# Patient Record
Sex: MALE | Race: WHITE | NOT HISPANIC OR LATINO | Employment: OTHER | URBAN - METROPOLITAN AREA
[De-identification: names, ages, dates, MRNs, and addresses within clinical notes are randomized per-mention and may not be internally consistent; named-entity substitution may affect disease eponyms.]

---

## 2017-01-06 ENCOUNTER — OFFICE VISIT (OUTPATIENT)
Dept: MEDICAL GROUP | Facility: CLINIC | Age: 52
End: 2017-01-06
Payer: COMMERCIAL

## 2017-01-06 VITALS
RESPIRATION RATE: 18 BRPM | TEMPERATURE: 97.2 F | WEIGHT: 148 LBS | SYSTOLIC BLOOD PRESSURE: 110 MMHG | HEIGHT: 69 IN | OXYGEN SATURATION: 95 % | DIASTOLIC BLOOD PRESSURE: 70 MMHG | HEART RATE: 75 BPM | BODY MASS INDEX: 21.92 KG/M2

## 2017-01-06 DIAGNOSIS — G47.09 OTHER INSOMNIA: ICD-10-CM

## 2017-01-06 DIAGNOSIS — I69.359 HEMIPARESIS AND ALTERATION OF SENSATIONS AS LATE EFFECTS OF CEREBROVASCULAR ACCIDENT (HCC): ICD-10-CM

## 2017-01-06 DIAGNOSIS — I69.398 HEMIPARESIS AND ALTERATION OF SENSATIONS AS LATE EFFECTS OF CEREBROVASCULAR ACCIDENT (HCC): ICD-10-CM

## 2017-01-06 DIAGNOSIS — I10 ESSENTIAL HYPERTENSION: Chronic | ICD-10-CM

## 2017-01-06 PROBLEM — Z85.841: Status: ACTIVE | Noted: 2017-01-06

## 2017-01-06 PROCEDURE — 99214 OFFICE O/P EST MOD 30 MIN: CPT | Performed by: FAMILY MEDICINE

## 2017-01-06 NOTE — PROGRESS NOTES
Chief Complaint   Patient presents with   • Follow-Up     3 month , questions regarding sleeping medication     Multiple medical problems    HISTORY OF PRESENT ILLNESS: Patient is a 51 y.o. male established patient who presents today for the following.      1. Essential hypertension  Patient had added metoprolol to his blood pressure regimen. He found that he did not tolerate the side effects very well and has stopped that medication. He also did not find it to be helpful for his stress..  He has stopped taking that medication. His blood pressure has continued to fall in a good range.    2. Other insomnia  Patient has history of episodic insomnia. He did not want to take a prescription medication. Inquired about over-the-counter medications. We discussed the use of diphenhydramine. Also discussed melatonin.    3. 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 Continues to have right-sided deficits secondary to his stroke. No new strokelike symptoms.      No problem-specific assessment & plan notes found for this encounter.      He  has a past medical history of Hypertension; Snoring; Brain tumor (2011); Other specified disorder of intestines; Barany's nystagmus (11/14/2011); and Vestibular disorder (11/14/2011).    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 60 Cap 0   • baclofen (LIORESAL) 10 MG Tab Take 1 Tab by mouth 3 times a day. 90 Tab 0   • docusate sodium (COLACE) 100 MG CAPS Take 1 Cap by mouth 2 times a day as needed for Constipation. 60 Cap 3     No current facility-administered medications for this visit.       Social History   Substance Use Topics   • Smoking status: Never Smoker    • Smokeless tobacco: Never Used   • Alcohol Use: No      Comment: occ 5-6 drinks per week       Family History   Problem Relation Age of Onset   • Cancer Mother      brain tumor, chronic leukemia   • Hypertension Father    • Hypertension Sister        Health Maintenance:      Review of Systems   No fever, chills, nausea, vomiting, diarrhea, chest pain or shortness of breath.  See HPI    Exam:  Blood pressure 110/70, pulse 75, temperature 36.2 °C (97.2 °F), resp. rate 18, height 1.74 m (5' 8.5\"), weight 67.132 kg (148 lb), SpO2 95 %. Body mass index is 22.17 kg/(m^2).  Constitutional:  NAD, well appearing.  HEENT:   NC/AT, PERRLA, EOMI, OP clear, no lymphadenopathy, no thyromegaly.    Cardiovascular: RRR.   No m/r/g. No carotid bruits.       Lungs:   CTAB, no w/r/r, no respiratory distress.  Abdomen: Soft, NT/ND + BS, no masses, no suprapubic tenderness, no hepatomegaly.  Extremities:  2+ DP and radial pulses bilaterally.  No c/c/e.  Skin:  Warm and dry.    Neurologic: Alert & oriented x 3, right sided facial droop  Psychiatric:  Affect normal, mood normal, judgment normal.    Assessment/Plan:     1. Essential hypertension  Patient has a history of hypertension. Has stopped his beta blocker. Blood pressure is well controlled. Did not help with his anxiety. Labs as indicated  - CBC WITHOUT DIFFERENTIAL; Future  - COMP METABOLIC PANEL; Future  - LIPID PROFILE; Future  - ABO AND RH DETERMINATION    2. " Other insomnia  Discussed the use of diphenhydramine. Patient will try this medication.    3. Hemiparesis and alteration of sensations as late effects of cerebrovascular accident (HCC)  Chronic and ongoing problem for the patient. No new deficits or strokelike symptoms. Continue to monitor. Continue blood pressure management.        Followup: No Follow-up on file.    Please note that this dictation was created using voice recognition software. I have made every reasonable attempt to correct obvious errors, but I expect that there are errors of grammar and possibly content that I did not discover before finalizing the note.

## 2017-01-06 NOTE — MR AVS SNAPSHOT
"        Raul ANTOINNO Hinojosa   2017 9:20 AM   Office Visit   MRN: [id number]    Department:  [facility]   Dept Phone:  [phone]    Description:  Male : 1965   Provider:  Anibal Wilson M.D.           Reason for Visit     Follow-Up 3 month , questions regarding sleeping medication      Allergies as of 2017     Allergen Noted Reactions    Morphine 2011   Vomiting      You were diagnosed with     Essential hypertension   [[id number]]       Hemiparesis and alteration of sensations as late effects of cerebrovascular accident (HCC)   [[id number]]         Vital Signs     Blood Pressure Pulse Temperature Respirations Height Weight    110/70 mmHg 75 36.2 °C (97.2 °F) 18 1.74 m (5' 8.5\") 67.132 kg (148 lb)    Body Mass Index Oxygen Saturation Smoking Status             22.17 kg/m2 95% Never Smoker          Basic Information     Date Of Birth Sex Race Ethnicity Preferred Language    1965 Male White Non- English      Problem List              ICD-10-CM Priority Class Noted - Resolved    Hypertension (Chronic) I10   2011 - Present    Ependymoma of brain (HCC) (Chronic) C71.9   2011 - Present    S/P shoulder surgery right labrum (Chronic) Z98.890   2011 - Present    S/P appendectomy (Chronic) Z90.49   2011 - Present    BPH (benign prostatic hypertrophy) (Chronic) N40.0   2011 - Present    Spondylolisthesis (Chronic) M43.10   2011 - Present    Preventative health care (Chronic) Z00.00   2011 - Present    Martinez's esophagus with esophagitis (Chronic) K22.70, K20.9   2011 - Present    Stroke (HCC) (Chronic) I63.9   3/16/2011 - Present    PFO (patent foramen ovale) (Chronic) Q21.1   2011 - Present    Hearing loss (Chronic)    2011 - Present    Barany's nystagmus H55.09   2011 - Present    Vestibular disorder H81.90   2011 - Present    History of stroke Z86.73   2015 - Present    Hemiparesis and alteration of sensations as late " effects of cerebrovascular accident (HCC) I69.359, I69.398   2/9/2015 - Present      Health Maintenance        Date Due Completion Dates    IMM DTaP/Tdap/Td Vaccine (1 - Tdap) 1/25/1982 ---    [facility]ON CANCER SCREENING ANNUAL FIT 8/15/2017 8/15/2016            Current Immunizations     Influenza Vaccine Quad Inj (Preserved) 11/7/2016 11:50 AM      Below and/or attached are the medications your provider expects you to take. Review all of your home medications and newly ordered medications with your provider and/or pharmacist. Follow medication instructions as directed by your provider and/or pharmacist. Please keep your medication list with you and share with your provider. Update the information when medications are discontinued, doses are changed, or new medications (including over-the-counter products) are added; and carry medication information at all times in the event of emergency situations     Allergies:  MORPHINE - Vomiting               Medications  Valid as of: January 06, 2017 -  9:58 AM    Generic Name Brand Name Tablet Size Instructions for use    Amlodipine Besy-Benazepril HCl (Cap) LOTREL 5-10 MG TAKE 1 CAPSULE BY MOUTH DAILY        Baclofen (Tab) LIORESAL 10 MG Take 1 Tab by mouth 3 times a day.        Docusate Sodium (Cap) COLACE 100 MG Take 1 Cap by mouth 2 times a day as needed for Constipation.        Gabapentin (Cap) NEURONTIN 300 MG Take 1 Cap by mouth 2 Times a Day.        .                 Medicines prescribed today were sent to:     [id number] - [place] - [address]    [address] [facility] [address]    Phone: [phone] Fax: [phone]    Open 24 Hours?: No    [id number] - [place], [place] - [address]    [address] [facility] [place] [id number]    Phone: [phone] Fax: [phone]    Open 24 Hours?: No      Medication refill instructions:       If your prescription bottle indicates you have medication refills left, it is not  necessary to call your provider’s office. Please contact your pharmacy and they will refill your medication.    If your prescription bottle indicates you do not have any refills left, you may request refills at any time through one of the following ways: The online [facility] system (except Urgent Care), by calling your provider’s office, or by asking your pharmacy to contact your provider’s office with a refill request. Medication refills are processed only during regular business hours and may not be available until the next business day. Your provider may request additional information or to have a follow-up visit with you prior to refilling your medication.   *Please Note: Medication refills are assigned a new Rx number when refilled electronically. Your pharmacy may indicate that no refills were authorized even though a new prescription for the same medication is available at the pharmacy. Please request the medicine by name with the pharmacy before contacting your provider for a refill.        Your To Do List     Future Labs/Procedures Complete By Expires    CBC WITHOUT DIFFERENTIAL  As directed 7/9/2017    COMP METABOLIC PANEL  As directed 1/7/2018    LIPID PROFILE  As directed 1/7/2018      Other Notes About Your Plan     3/11 consider flomax trial next visit  8/2/11 Dr. Al neurosurgery note recommend repeat MRI with and without contrast 4 months, if no change then q.6 months           MyChart Access Code: Activation code not generated  Current [facility] Status: Active

## 2017-01-19 ENCOUNTER — HOSPITAL ENCOUNTER (OUTPATIENT)
Dept: LAB | Facility: MEDICAL CENTER | Age: 52
End: 2017-01-19
Attending: FAMILY MEDICINE
Payer: COMMERCIAL

## 2017-01-19 DIAGNOSIS — I10 ESSENTIAL HYPERTENSION: Chronic | ICD-10-CM

## 2017-01-19 LAB
ABO GROUP BLD: NORMAL
ALBUMIN SERPL BCP-MCNC: 4.6 G/DL (ref 3.2–4.9)
ALBUMIN/GLOB SERPL: 1.5 G/DL
ALP SERPL-CCNC: 82 U/L (ref 30–99)
ALT SERPL-CCNC: 19 U/L (ref 2–50)
ANION GAP SERPL CALC-SCNC: 6 MMOL/L (ref 0–11.9)
AST SERPL-CCNC: 21 U/L (ref 12–45)
BILIRUB SERPL-MCNC: 0.8 MG/DL (ref 0.1–1.5)
BUN SERPL-MCNC: 16 MG/DL (ref 8–22)
CALCIUM SERPL-MCNC: 9.8 MG/DL (ref 8.5–10.5)
CHLORIDE SERPL-SCNC: 105 MMOL/L (ref 96–112)
CHOLEST SERPL-MCNC: 170 MG/DL (ref 100–199)
CO2 SERPL-SCNC: 31 MMOL/L (ref 20–33)
CREAT SERPL-MCNC: 0.92 MG/DL (ref 0.5–1.4)
ERYTHROCYTE [DISTWIDTH] IN BLOOD BY AUTOMATED COUNT: 38.7 FL (ref 35.9–50)
GLOBULIN SER CALC-MCNC: 3.1 G/DL (ref 1.9–3.5)
GLUCOSE SERPL-MCNC: 95 MG/DL (ref 65–99)
HCT VFR BLD AUTO: 47.3 % (ref 42–52)
HDLC SERPL-MCNC: 57 MG/DL
HGB BLD-MCNC: 15.1 G/DL (ref 14–18)
LDLC SERPL CALC-MCNC: 101 MG/DL
MCH RBC QN AUTO: 28.5 PG (ref 27–33)
MCHC RBC AUTO-ENTMCNC: 31.9 G/DL (ref 33.7–35.3)
MCV RBC AUTO: 89.4 FL (ref 81.4–97.8)
PLATELET # BLD AUTO: 211 K/UL (ref 164–446)
PMV BLD AUTO: 10.2 FL (ref 9–12.9)
POTASSIUM SERPL-SCNC: 4.4 MMOL/L (ref 3.6–5.5)
PROT SERPL-MCNC: 7.7 G/DL (ref 6–8.2)
RBC # BLD AUTO: 5.29 M/UL (ref 4.7–6.1)
RH BLD: NORMAL
SODIUM SERPL-SCNC: 142 MMOL/L (ref 135–145)
TRIGL SERPL-MCNC: 60 MG/DL (ref 0–149)
WBC # BLD AUTO: 3.9 K/UL (ref 4.8–10.8)

## 2017-01-19 PROCEDURE — 80053 COMPREHEN METABOLIC PANEL: CPT

## 2017-01-19 PROCEDURE — 86901 BLOOD TYPING SEROLOGIC RH(D): CPT

## 2017-01-19 PROCEDURE — 36415 COLL VENOUS BLD VENIPUNCTURE: CPT

## 2017-01-19 PROCEDURE — 80061 LIPID PANEL: CPT

## 2017-01-19 PROCEDURE — 85027 COMPLETE CBC AUTOMATED: CPT

## 2017-01-19 PROCEDURE — 86900 BLOOD TYPING SEROLOGIC ABO: CPT

## 2017-03-31 ENCOUNTER — TELEPHONE (OUTPATIENT)
Dept: MEDICAL GROUP | Facility: MEDICAL CENTER | Age: 52
End: 2017-03-31

## 2017-03-31 DIAGNOSIS — H11.151 PINGUECULA OF RIGHT EYE: ICD-10-CM

## 2017-03-31 DIAGNOSIS — H25.093: ICD-10-CM

## 2017-03-31 DIAGNOSIS — H04.123 BILATERAL DRY EYES: ICD-10-CM

## 2017-03-31 DIAGNOSIS — H40.023 OPEN ANGLE WITH BORDERLINE FINDINGS AND HIGH GLAUCOMA RISK IN BOTH EYES: ICD-10-CM

## 2017-03-31 NOTE — TELEPHONE ENCOUNTER
Pt.'s prev PCP is Dr. Wilson and needs a referral to an eye MD. I pended the referral.  Thank you,

## 2017-04-10 DIAGNOSIS — H04.123 DRY EYES, BILATERAL: ICD-10-CM

## 2017-04-10 DIAGNOSIS — H40.023 OPEN ANGLE WITH BORDERLINE FINDINGS AND HIGH GLAUCOMA RISK IN BOTH EYES: ICD-10-CM

## 2017-04-10 DIAGNOSIS — H11.151 PINGUECULA OF RIGHT EYE: ICD-10-CM

## 2017-04-10 DIAGNOSIS — H25.093: ICD-10-CM

## 2017-04-11 ENCOUNTER — OFFICE VISIT (OUTPATIENT)
Dept: MEDICAL GROUP | Facility: PHYSICIAN GROUP | Age: 52
End: 2017-04-11
Payer: COMMERCIAL

## 2017-04-11 VITALS
OXYGEN SATURATION: 91 % | HEIGHT: 69 IN | RESPIRATION RATE: 16 BRPM | SYSTOLIC BLOOD PRESSURE: 100 MMHG | WEIGHT: 147.71 LBS | BODY MASS INDEX: 21.88 KG/M2 | TEMPERATURE: 97.7 F | DIASTOLIC BLOOD PRESSURE: 70 MMHG | HEART RATE: 90 BPM

## 2017-04-11 DIAGNOSIS — I10 ESSENTIAL HYPERTENSION: ICD-10-CM

## 2017-04-11 DIAGNOSIS — Z23 NEED FOR VACCINATION: ICD-10-CM

## 2017-04-11 DIAGNOSIS — Z85.841 HISTORY OF EPENDYMOMA OF BRAIN: ICD-10-CM

## 2017-04-11 DIAGNOSIS — Z86.73 HISTORY OF STROKE: ICD-10-CM

## 2017-04-11 DIAGNOSIS — I69.359 HEMIPARESIS AND ALTERATION OF SENSATIONS AS LATE EFFECTS OF CEREBROVASCULAR ACCIDENT (HCC): ICD-10-CM

## 2017-04-11 DIAGNOSIS — I69.398 HEMIPARESIS AND ALTERATION OF SENSATIONS AS LATE EFFECTS OF CEREBROVASCULAR ACCIDENT (HCC): ICD-10-CM

## 2017-04-11 PROCEDURE — 90471 IMMUNIZATION ADMIN: CPT | Performed by: FAMILY MEDICINE

## 2017-04-11 PROCEDURE — 90715 TDAP VACCINE 7 YRS/> IM: CPT | Performed by: FAMILY MEDICINE

## 2017-04-11 PROCEDURE — 99214 OFFICE O/P EST MOD 30 MIN: CPT | Mod: 25 | Performed by: FAMILY MEDICINE

## 2017-04-11 RX ORDER — AMLODIPINE BESYLATE AND BENAZEPRIL HYDROCHLORIDE 2.5; 1 MG/1; MG/1
1 CAPSULE ORAL DAILY
Qty: 30 CAP | Refills: 2 | Status: SHIPPED | OUTPATIENT
Start: 2017-04-11 | End: 2017-06-12

## 2017-04-11 NOTE — PATIENT INSTRUCTIONS

## 2017-04-11 NOTE — PROGRESS NOTES
Raul Hinojosa is a 52 y.o. male here to establish care and discuss blood pressure.    HPI:  Raul is a 52-year-old pleasant male here to establish care. His previous PCP was Dr. Wilson.    Essential hypertension  BP slightly low today. Patient does monitor his blood pressure at home and it has been in the low 100s to 110s over 60s to 70s. He is currently taking Lotrel 5-10 milligrams per day. Denies any side effects. Denies vision changes, headaches, shortness of breath or chest pain. He tells me that his blood pressure problems were diagnosed approximately 6 years ago.    History of ependymoma of brain  Per patient history. Diagnosed ~2011 when he went to the hospital for high blood pressure. He did have surgery by Dr. Beckford.    History of stroke  Patient has a history of stroke secondary to brain surgery. Has some residual vision, sensation and strength issues. He is on disability. He takes his medications as prescribed.      Current medicines (including changes today)  Current Outpatient Prescriptions   Medication Sig Dispense Refill   • amlodipine-benazepril (LOTREL) 2.5-10 MG oral capsule Take 1 Cap by mouth every day. 30 Cap 2   • gabapentin (NEURONTIN) 300 MG Cap Take 1 Cap by mouth 2 Times a Day. 60 Cap 0   • baclofen (LIORESAL) 10 MG Tab Take 1 Tab by mouth 3 times a day. 90 Tab 0   • docusate sodium (COLACE) 100 MG CAPS Take 1 Cap by mouth 2 times a day as needed for Constipation. 60 Cap 3     No current facility-administered medications for this visit.     He  has a past medical history of Hypertension; Snoring; Brain tumor (CMS-HCC) (2011); Other specified disorder of intestines; Barany's nystagmus (11/14/2011); and Vestibular disorder (11/14/2011).  He  has past surgical history that includes appendectomy (1983); other orthopedic surgery (1996); craniotomy (2/7/2011); cervical laminectomy posterior (2/7/2011); and gastroscopy with biopsy (2/22/2011).  Social History   Substance Use Topics   •  "Smoking status: Never Smoker    • Smokeless tobacco: Never Used   • Alcohol Use: No      Comment: occ 5-6 drinks per week     Social History     Social History Narrative     Family History   Problem Relation Age of Onset   • Cancer Mother      brain tumor, chronic leukemia   • Hypertension Father    • Hypertension Sister      Family Status   Relation Status Death Age   • Mother Alive    • Father Alive    • Sister Alive    • Sister Alive      ROS  Constitutional: Negative for fever, chills and malaise/fatigue.   HENT: Negative for congestion.    Eyes: Negative for pain.   Respiratory: Negative for cough and shortness of breath.    Cardiovascular: Negative for leg swelling.   Gastrointestinal: Negative for nausea, vomiting, abdominal pain and diarrhea.   Genitourinary: Negative for dysuria and hematuria.   Skin: Negative for rash.   Neurological: Negative for dizziness, focal weakness and headaches.   Endo/Heme/Allergies: Does not bruise/bleed easily.   Psychiatric/Behavioral: Negative for depression.  The patient is not nervous/anxious.       Objective:     Physical Exam:  Blood pressure 100/70, pulse 90, temperature 36.5 °C (97.7 °F), resp. rate 16, height 1.753 m (5' 9.02\"), weight 67 kg (147 lb 11.3 oz), SpO2 91 %. Body mass index is 21.8 kg/(m^2).  Constitutional: Alert, no distress.  Skin: Warm, dry, good turgor, no rashes in visible areas.  Eye: Equal, round and reactive, conjunctiva clear, lids normal.  ENMT: +Right hearing aide, lips without lesions, good dentition, oropharynx clear.  Neck: Trachea midline, no masses, no thyromegaly. No cervical or supraclavicular lymphadenopathy.  Respiratory: Unlabored respiratory effort, lungs clear to auscultation, no wheezes, no ronchi.  Cardiovascular: Normal S1, S2, no murmur, no edema.  Abdomen: Soft, non-tender, no masses, no hepatosplenomegaly.  Psych: Alert and oriented x3, normal affect and mood.    Assessment and Plan:     1. Essential hypertension  Slightly " hypotensive today. Query if he needs two blood pressure medications. I will decrease the amlodipine to 2.5mg with goal to discontinue it and continue benazepril. He will continue to monitor at home.  - amlodipine-benazepril (LOTREL) 2.5-10 MG oral capsule; Take 1 Cap by mouth every day.  Dispense: 30 Cap; Refill: 2    2. History of ependymoma of brain  3. History of stroke  4. Hemiparesis and alteration of sensations as late effects of cerebrovascular accident (CMS-HCC)  Per patient history. No indication for referral to neurology or PT at this time. Continue current medications.    5. Need for vaccination  Tdap discussed and administered in office today.  - Tdap =>8yo IM    Records reviewed in Epic.  Followup: Return in about 2 months (around 6/11/2017) for f/u HTN and BP med change, short.         PLEASE NOTE: This dictation was created using voice recognition software. I have made every reasonable attempt to correct obvious errors, but I expect that there are errors of grammar and possibly content that I did not discover before finalizing the note.

## 2017-04-11 NOTE — MR AVS SNAPSHOT
"        Raul Hinojosa   2017 9:20 AM   Office Visit   MRN: [id number]    Department:  [facility]   Dept Phone:  [phone]    Description:  Male : 1965   Provider:  Mildred Vaz M.D.           Reason for Visit     Medication Refill Lotrel    Immunizations TDAP      Allergies as of 2017     Allergen Noted Reactions    Morphine 2011   Vomiting      You were diagnosed with     Essential hypertension   [9978027]       History of ependymoma of brain   [8466517]       History of stroke   [069732]       Hemiparesis and alteration of sensations as late effects of cerebrovascular accident (CMS-HCC)   [054508]       Need for vaccination   [591369]         Vital Signs     Blood Pressure Pulse Temperature Respirations Height Weight    100/70 mmHg 90 36.5 °C (97.7 °F) 16 1.753 m (5' 9.02\") 67 kg (147 lb 11.3 oz)    Body Mass Index Oxygen Saturation Smoking Status             21.80 kg/m2 91% Never Smoker          Basic Information     Date Of Birth Sex Race Ethnicity Preferred Language    1965 Male White Non- English      Your appointments     2017  8:40 AM   Established Patient with Mildred Vaz M.D.   [facility] (--)    [address]  [address]  [facility] [address] [phone]           You will be receiving a confirmation call a few days before your appointment from our automated call confirmation system.              Problem List              ICD-10-CM Priority Class Noted - Resolved    Essential hypertension I10   2011 - Present    BPH (benign prostatic hypertrophy) (Chronic) N40.0   2011 - Present    Spondylolisthesis (Chronic) M43.10   2011 - Present    Martinez's esophagus with esophagitis (Chronic) K22.70, K20.9   2011 - Present    PFO (patent foramen ovale) (Chronic) Q21.1   2011 - Present    Hearing loss (Chronic)    2011 - Present    Barany's nystagmus H55.09   2011 - Present    Vestibular disorder H81.90   " 11/14/2011 - Present    History of stroke Z86.73   2/9/2015 - Present    Hemiparesis and alteration of sensations as late effects of cerebrovascular accident (CMS-HCC) I69.359, I69.398   2/9/2015 - Present    History of ependymoma of brain Z85.841   1/6/2017 - Present      Health Maintenance        Date Due Completion Dates    IMM DTaP/Tdap/Td Vaccine (1 - Tdap) 1/25/1982 ---    COLON CANCER SCREENING ANNUAL FIT 8/15/2017 8/15/2016            Current Immunizations     Influenza Vaccine Quad Inj (Preserved) 11/7/2016 11:50 AM    Tdap Vaccine 4/11/2017 10:08 AM      Below and/or attached are the medications your provider expects you to take. Review all of your home medications and newly ordered medications with your provider and/or pharmacist. Follow medication instructions as directed by your provider and/or pharmacist. Please keep your medication list with you and share with your provider. Update the information when medications are discontinued, doses are changed, or new medications (including over-the-counter products) are added; and carry medication information at all times in the event of emergency situations     Allergies:  MORPHINE - Vomiting               Medications  Valid as of: April 11, 2017 - 10:23 AM    Generic Name Brand Name Tablet Size Instructions for use    Amlodipine Besy-Benazepril HCl (Cap) LOTREL 2.5-10 MG Take 1 Cap by mouth every day.        Baclofen (Tab) LIORESAL 10 MG Take 1 Tab by mouth 3 times a day.        Docusate Sodium (Cap) COLACE 100 MG Take 1 Cap by mouth 2 times a day as needed for Constipation.        Gabapentin (Cap) NEURONTIN 300 MG Take 1 Cap by mouth 2 Times a Day.        .                 Medicines prescribed today were sent to:     [id number] - [place] - [address]    [address] [place] [address]    Phone: [phone] Fax: [phone]    Open 24 Hours?: No    [facility] - [place], [place] - [address]    [address] [facility]  Pharmacy (not Specialty) [place] [id number]    Phone: [phone] Fax: [phone]    Open 24 Hours?: No      Medication refill instructions:       If your prescription bottle indicates you have medication refills left, it is not necessary to call your provider’s office. Please contact your pharmacy and they will refill your medication.    If your prescription bottle indicates you do not have any refills left, you may request refills at any time through one of the following ways: The online [employer] system (except Urgent Care), by calling your provider’s office, or by asking your pharmacy to contact your provider’s office with a refill request. Medication refills are processed only during regular business hours and may not be available until the next business day. Your provider may request additional information or to have a follow-up visit with you prior to refilling your medication.   *Please Note: Medication refills are assigned a new Rx number when refilled electronically. Your pharmacy may indicate that no refills were authorized even though a new prescription for the same medication is available at the pharmacy. Please request the medicine by name with the pharmacy before contacting your provider for a refill.        Instructions    Hypertension  Hypertension, commonly called high blood pressure, is when the force of blood pumping through your arteries is too strong. Your arteries are the blood vessels that carry blood from your heart throughout your body. A blood pressure reading consists of a higher number over a lower number, such as 110/72. The higher number (systolic) is the pressure inside your arteries when your heart pumps. The lower number (diastolic) is the pressure inside your arteries when your heart relaxes. Ideally you want your blood pressure below 120/80.  Hypertension forces your heart to work harder to pump blood. Your arteries may become narrow or stiff. Having untreated or uncontrolled  hypertension can cause heart attack, stroke, kidney disease, and other problems.  RISK FACTORS  Some risk factors for high blood pressure are controllable. Others are not.   Risk factors you cannot control include:   · Race. You may be at higher risk if you are .  · Age. Risk increases with age.  · Gender. Men are at higher risk than women before age 45 years. After age 65, women are at higher risk than men.  Risk factors you can control include:  · Not getting enough exercise or physical activity.  · Being overweight.  · Getting too much fat, sugar, calories, or salt in your diet.  · Drinking too much alcohol.  SIGNS AND SYMPTOMS  Hypertension does not usually cause signs or symptoms. Extremely high blood pressure (hypertensive crisis) may cause headache, anxiety, shortness of breath, and nosebleed.  DIAGNOSIS  To check if you have hypertension, your health care provider will measure your blood pressure while you are seated, with your arm held at the level of your heart. It should be measured at least twice using the same arm. Certain conditions can cause a difference in blood pressure between your right and left arms. A blood pressure reading that is higher than normal on one occasion does not mean that you need treatment. If it is not clear whether you have high blood pressure, you may be asked to return on a different day to have your blood pressure checked again. Or, you may be asked to monitor your blood pressure at home for 1 or more weeks.  TREATMENT  Treating high blood pressure includes making lifestyle changes and possibly taking medicine. Living a healthy lifestyle can help lower high blood pressure. You may need to change some of your habits.  Lifestyle changes may include:  · Following the DASH diet. This diet is high in fruits, vegetables, and whole grains. It is low in salt, red meat, and added sugars.  · Keep your sodium intake below 2,300 mg per day.  · Getting at least 30-45  minutes of aerobic exercise at least 4 times per week.  · Losing weight if necessary.  · Not smoking.  · Limiting alcoholic beverages.  · Learning ways to reduce stress.  Your health care provider may prescribe medicine if lifestyle changes are not enough to get your blood pressure under control, and if one of the following is true:  · You are 18-59 years of age and your systolic blood pressure is above 140.  · You are 60 years of age or older, and your systolic blood pressure is above 150.  · Your diastolic blood pressure is above 90.  · You have diabetes, and your systolic blood pressure is over 140 or your diastolic blood pressure is over 90.  · You have kidney disease and your blood pressure is above 140/90.  · You have heart disease and your blood pressure is above 140/90.  Your personal target blood pressure may vary depending on your medical conditions, your age, and other factors.  HOME CARE INSTRUCTIONS  · Have your blood pressure rechecked as directed by your health care provider.    · Take medicines only as directed by your health care provider. Follow the directions carefully. Blood pressure medicines must be taken as prescribed. The medicine does not work as well when you skip doses. Skipping doses also puts you at risk for problems.  · Do not smoke.    · Monitor your blood pressure at home as directed by your health care provider.   SEEK MEDICAL CARE IF:   · You think you are having a reaction to medicines taken.  · You have recurrent headaches or feel dizzy.  · You have swelling in your ankles.  · You have trouble with your vision.  SEEK IMMEDIATE MEDICAL CARE IF:  · You develop a severe headache or confusion.  · You have unusual weakness, numbness, or feel faint.  · You have severe chest or abdominal pain.  · You vomit repeatedly.  · You have trouble breathing.  MAKE SURE YOU:   · Understand these instructions.  · Will watch your condition.  · Will get help right away if you are not doing well or get  worse.     This information is not intended to replace advice given to you by your health care provider. Make sure you discuss any questions you have with your health care provider.     Document Released: 12/18/2006 Document Revised: 05/03/2016 Document Reviewed: 10/10/2014  [employer] Interactive Patient Education ©2016 [employer]vier Inc.            [employer] Access Code: Activation code not generated  Current [employer] Status: Active

## 2017-04-11 NOTE — ASSESSMENT & PLAN NOTE
Per patient history. Diagnosed ~2011 when he went to the hospital for high blood pressure. He did have surgery by Dr. Beckford.

## 2017-04-11 NOTE — ASSESSMENT & PLAN NOTE
BP slightly low today. Patient does monitor his blood pressure at home and it has been in the low 100s to 110s over 60s to 70s. He is currently taking Lotrel 5-10 milligrams per day. Denies any side effects. Denies vision changes, headaches, shortness of breath or chest pain. He tells me that his blood pressure problems were diagnosed approximately 6 years ago.

## 2017-04-11 NOTE — ASSESSMENT & PLAN NOTE
Patient has a history of stroke secondary to brain surgery. Has some residual vision, sensation and strength issues. He is on disability. He takes his medications as prescribed.

## 2017-06-12 ENCOUNTER — OFFICE VISIT (OUTPATIENT)
Dept: MEDICAL GROUP | Facility: PHYSICIAN GROUP | Age: 52
End: 2017-06-12
Payer: COMMERCIAL

## 2017-06-12 VITALS
HEIGHT: 69 IN | BODY MASS INDEX: 22.31 KG/M2 | SYSTOLIC BLOOD PRESSURE: 112 MMHG | HEART RATE: 86 BPM | OXYGEN SATURATION: 96 % | DIASTOLIC BLOOD PRESSURE: 72 MMHG | WEIGHT: 150.6 LBS | RESPIRATION RATE: 16 BRPM | TEMPERATURE: 97.9 F

## 2017-06-12 DIAGNOSIS — Z86.73 HISTORY OF STROKE: ICD-10-CM

## 2017-06-12 DIAGNOSIS — I10 ESSENTIAL HYPERTENSION: ICD-10-CM

## 2017-06-12 PROCEDURE — 99214 OFFICE O/P EST MOD 30 MIN: CPT | Performed by: FAMILY MEDICINE

## 2017-06-12 RX ORDER — BENAZEPRIL HYDROCHLORIDE 10 MG/1
10 TABLET ORAL DAILY
Qty: 30 TAB | Refills: 2 | Status: SHIPPED | OUTPATIENT
Start: 2017-06-12 | End: 2017-09-08 | Stop reason: SDUPTHER

## 2017-06-12 NOTE — PROGRESS NOTES
"Subjective:   Raul Hinojosa is a 52 y.o. male here today for follow-up hypertension.    Essential hypertension  Stable. Monitoring BP at home. Currently taking amlodipine-benazepril 2.5-10mg daily as directed. The amlodipine dose was decreased at his last appointment. Also taking baby aspirin. Denies lightheadedness, vision changes, headache, palpitations or leg swelling. He does have a history of stroke and ependymoma resection.     Current medicines (including changes today)  Current Outpatient Prescriptions   Medication Sig Dispense Refill   • benazepril (LOTENSIN) 10 MG Tab Take 1 Tab by mouth every day. 30 Tab 2   • gabapentin (NEURONTIN) 300 MG Cap Take 1 Cap by mouth 2 Times a Day. 60 Cap 0   • baclofen (LIORESAL) 10 MG Tab Take 1 Tab by mouth 3 times a day. 90 Tab 0     No current facility-administered medications for this visit.     He  has a past medical history of Hypertension; Snoring; Brain tumor (CMS-HCC) (2011); Other specified disorder of intestines; Barany's nystagmus (11/14/2011); and Vestibular disorder (11/14/2011).    ROS   See HPI. No chest pain, no shortness of breath, no abdominal pain.     Objective:     Physical Exam:  Blood pressure 112/72, pulse 86, temperature 36.6 °C (97.9 °F), resp. rate 16, height 1.753 m (5' 9.02\"), SpO2 96 %. There is no weight on file to calculate BMI.   Constitutional: Alert, no distress.  Skin: Warm, dry, good turgor, no rashes in visible areas.  Eye: Conjunctiva clear, lids normal.  ENMT: Lips without lesions, good dentition, oropharynx clear.  Neck: Trachea midline, no masses, no thyromegaly.  Respiratory: Unlabored respiratory effort, lungs clear to auscultation, no wheezes, no ronchi.  Cardiovascular: Normal S1, S2, no murmur, no edema.  Abdomen: Soft, non-tender, no masses, no hepatosplenomegaly.  Psych: Alert and oriented x3, normal affect and mood.    Assessment and Plan:     1. Essential hypertension  2. History of stroke  Little change in blood pressure " with decrease in amlodipine. Will discontinue amlodipine component. Prescription sent for benazepril 10mg to pharmacy. Continue to monitor at home. Patient instructed to notify us if he has any issues with the medication change.  - benazepril (LOTENSIN) 10 MG Tab; Take 1 Tab by mouth every day.  Dispense: 30 Tab; Refill: 2    Followup: Return in about 3 months (around 9/12/2017) for HTN, Short.         PLEASE NOTE: This dictation was created using voice recognition software. I have made every reasonable attempt to correct obvious errors, but I expect that there are errors of grammar and possibly content that I did not discover before finalizing the note.

## 2017-06-12 NOTE — ASSESSMENT & PLAN NOTE
Stable. Monitoring BP at home. Currently taking amlodipine-benazepril 2.5-10mg daily as directed. The amlodipine dose was decreased at his last appointment. Also taking baby aspirin. Denies lightheadedness, vision changes, headache, palpitations or leg swelling. He does have a history of stroke and ependymoma resection.    He mentions that his [place]

## 2017-06-12 NOTE — MR AVS SNAPSHOT
"        Raul Hinojosa   2017 8:40 AM   Office Visit   MRN: [id number]    Department:  [facility]   Dept Phone:  [phone]    Description:  Male : 1965   Provider:  Mildred Vaz M.D.           Reason for Visit     Hypertension           Allergies as of 2017     Allergen Noted Reactions    Morphine 2011   Vomiting      You were diagnosed with     Essential hypertension   [0420370]       History of stroke   [048214]         Vital Signs     Blood Pressure Pulse Temperature Respirations Height Weight    112/72 mmHg 86 36.6 °C (97.9 °F) 16 1.753 m (5' 9.02\") 68.312 kg (150 lb 9.6 oz)    Body Mass Index Oxygen Saturation Smoking Status             22.23 kg/m2 96% Never Smoker          Basic Information     Date Of Birth Sex Race Ethnicity Preferred Language    1965 Male White Non- English      Your appointments     Sep 15, 2017  8:00 AM   Established Patient with Mildred Vaz M.D.   [facility] (--)    [address]  [address]  [facility] [address] [phone]           You will be receiving a confirmation call a few days before your appointment from our automated call confirmation system.              Problem List              ICD-10-CM Priority Class Noted - Resolved    Essential hypertension I10   2011 - Present    BPH (benign prostatic hypertrophy) (Chronic) N40.0   2011 - Present    Spondylolisthesis (Chronic) M43.10   2011 - Present    Martinez's esophagus with esophagitis (Chronic) K22.70, K20.9   2011 - Present    PFO (patent foramen ovale) (Chronic) Q21.1   2011 - Present    Hearing loss (Chronic)    2011 - Present    Barany's nystagmus H55.09   2011 - Present    Vestibular disorder H81.90   2011 - Present    History of stroke Z86.73   2015 - Present    Hemiparesis and alteration of sensations as late effects of cerebrovascular accident (CMS-[facility]) I69.359, I69.398   2015 - Present    History of ependymoma " of brain Z85.841   1/6/2017 - Present      Health Maintenance        Date Due Completion Dates    COLON CANCER SCREENING ANNUAL FIT 8/15/2017 8/15/2016    IMM DTaP/Tdap/Td Vaccine (2 - Td) 4/11/2027 4/11/2017            Current Immunizations     Influenza Vaccine Quad Inj (Preserved) 11/7/2016 11:50 AM    Tdap Vaccine 4/11/2017 10:08 AM      Below and/or attached are the medications your provider expects you to take. Review all of your home medications and newly ordered medications with your provider and/or pharmacist. Follow medication instructions as directed by your provider and/or pharmacist. Please keep your medication list with you and share with your provider. Update the information when medications are discontinued, doses are changed, or new medications (including over-the-counter products) are added; and carry medication information at all times in the event of emergency situations     Allergies:  MORPHINE - Vomiting               Medications  Valid as of: June 12, 2017 -  8:53 AM    Generic Name Brand Name Tablet Size Instructions for use    Baclofen (Tab) LIORESAL 10 MG Take 1 Tab by mouth 3 times a day.        Benazepril HCl (Tab) LOTENSIN 10 MG Take 1 Tab by mouth every day.        Gabapentin (Cap) NEURONTIN 300 MG Take 1 Cap by mouth 2 Times a Day.        .                 Medicines prescribed today were sent to:     [facility] - [place] - [address]    [address] [facility] [address]    Phone: [phone] Fax: [phone]    Open 24 Hours?: No    [facility] [place] [facility] - [place], [place] - [address]    [address] [facility] Order Pharmacy (not Specialty) [place] [address]    Phone: [phone] Fax: [phone]    Open 24 Hours?: No      Medication refill instructions:       If your prescription bottle indicates you have medication refills left, it is not necessary to call your provider’s office. Please contact your pharmacy and they will refill your medication.    If your  prescription bottle indicates you do not have any refills left, you may request refills at any time through one of the following ways: The online [facility] system (except Urgent Care), by calling your provider’s office, or by asking your pharmacy to contact your provider’s office with a refill request. Medication refills are processed only during regular business hours and may not be available until the next business day. Your provider may request additional information or to have a follow-up visit with you prior to refilling your medication.   *Please Note: Medication refills are assigned a new Rx number when refilled electronically. Your pharmacy may indicate that no refills were authorized even though a new prescription for the same medication is available at the pharmacy. Please request the medicine by name with the pharmacy before contacting your provider for a refill.           [facility] Access Code: Activation code not generated  Current [facility] Status: Active

## 2017-06-14 ENCOUNTER — TELEPHONE (OUTPATIENT)
Dept: MEDICAL GROUP | Facility: PHYSICIAN GROUP | Age: 52
End: 2017-06-14

## 2017-06-14 NOTE — TELEPHONE ENCOUNTER
Patient called and LVM stating he is going to [place] and is requesting a handicap letter from you to excuse him from standing in line for the rides there.  To  to advise.

## 2017-06-14 NOTE — Clinical Note
2017      To Whom It May Concern:    Re: Raul Hinojosa; : 1965    Mr. Hinojosa is a patient of mine and his most recent appointment was 2017. He will be traveling to [facility] this summer. His medical history is significant for a brain tumor and stroke. Due to this, he has residual disabilities including muscle weakness and vision issues that limit his physical activity.    It would be greatly appreciated it if he is excused from waiting in lines. He may participate in any rides or exhibits at [facility].    Thank you for helping my patient. Please feel free to call our office if you have any questions.      Sincerely,        Mildred Vaz M.D.

## 2017-09-08 DIAGNOSIS — I10 ESSENTIAL HYPERTENSION: ICD-10-CM

## 2017-09-08 RX ORDER — BENAZEPRIL HYDROCHLORIDE 10 MG/1
10 TABLET ORAL DAILY
Qty: 30 TAB | Refills: 11 | Status: SHIPPED | OUTPATIENT
Start: 2017-09-08 | End: 2017-09-15 | Stop reason: SDUPTHER

## 2017-09-12 ENCOUNTER — HOSPITAL ENCOUNTER (OUTPATIENT)
Dept: LAB | Facility: MEDICAL CENTER | Age: 52
End: 2017-09-12
Attending: PSYCHIATRY & NEUROLOGY
Payer: COMMERCIAL

## 2017-09-12 ENCOUNTER — HOSPITAL ENCOUNTER (OUTPATIENT)
Dept: LAB | Facility: MEDICAL CENTER | Age: 52
End: 2017-09-12
Payer: COMMERCIAL

## 2017-09-12 LAB
ALBUMIN SERPL BCP-MCNC: 4.5 G/DL (ref 3.2–4.9)
ALBUMIN/GLOB SERPL: 1.5 G/DL
ALP SERPL-CCNC: 70 U/L (ref 30–99)
ALT SERPL-CCNC: 15 U/L (ref 2–50)
ANION GAP SERPL CALC-SCNC: 9 MMOL/L (ref 0–11.9)
AST SERPL-CCNC: 20 U/L (ref 12–45)
BASOPHILS # BLD AUTO: 1 % (ref 0–1.8)
BASOPHILS # BLD: 0.04 K/UL (ref 0–0.12)
BDY FAT % MEASURED: 19.6 %
BILIRUB SERPL-MCNC: 0.8 MG/DL (ref 0.1–1.5)
BP DIAS: 78 MMHG
BP SYS: 107 MMHG
BUN SERPL-MCNC: 12 MG/DL (ref 8–22)
CALCIUM SERPL-MCNC: 9.8 MG/DL (ref 8.5–10.5)
CHLORIDE SERPL-SCNC: 104 MMOL/L (ref 96–112)
CHOLEST SERPL-MCNC: 179 MG/DL (ref 100–199)
CO2 SERPL-SCNC: 27 MMOL/L (ref 20–33)
CREAT SERPL-MCNC: 0.77 MG/DL (ref 0.5–1.4)
DIABETES HTDIA: NO
EOSINOPHIL # BLD AUTO: 0.09 K/UL (ref 0–0.51)
EOSINOPHIL NFR BLD: 2.3 % (ref 0–6.9)
ERYTHROCYTE [DISTWIDTH] IN BLOOD BY AUTOMATED COUNT: 38.8 FL (ref 35.9–50)
EVENT NAME HTEVT: NORMAL
FASTING HTFAS: YES
GFR SERPL CREATININE-BSD FRML MDRD: >60 ML/MIN/1.73 M 2
GLOBULIN SER CALC-MCNC: 3 G/DL (ref 1.9–3.5)
GLUCOSE SERPL-MCNC: 92 MG/DL (ref 65–99)
GLUCOSE SERPL-MCNC: 99 MG/DL (ref 65–99)
HCT VFR BLD AUTO: 46.6 % (ref 42–52)
HDLC SERPL-MCNC: 55 MG/DL
HGB BLD-MCNC: 15.4 G/DL (ref 14–18)
HYPERTENSION HTHYP: YES
IMM GRANULOCYTES # BLD AUTO: 0 K/UL (ref 0–0.11)
IMM GRANULOCYTES NFR BLD AUTO: 0 % (ref 0–0.9)
LDLC SERPL CALC-MCNC: 111 MG/DL
LYMPHOCYTES # BLD AUTO: 1.55 K/UL (ref 1–4.8)
LYMPHOCYTES NFR BLD: 39.5 % (ref 22–41)
MCH RBC QN AUTO: 29.2 PG (ref 27–33)
MCHC RBC AUTO-ENTMCNC: 33 G/DL (ref 33.7–35.3)
MCV RBC AUTO: 88.4 FL (ref 81.4–97.8)
MONOCYTES # BLD AUTO: 0.4 K/UL (ref 0–0.85)
MONOCYTES NFR BLD AUTO: 10.2 % (ref 0–13.4)
NEUTROPHILS # BLD AUTO: 1.84 K/UL (ref 1.82–7.42)
NEUTROPHILS NFR BLD: 47 % (ref 44–72)
NRBC # BLD AUTO: 0 K/UL
NRBC BLD AUTO-RTO: 0 /100 WBC
PLATELET # BLD AUTO: 220 K/UL (ref 164–446)
PMV BLD AUTO: 9.8 FL (ref 9–12.9)
POTASSIUM SERPL-SCNC: 4.5 MMOL/L (ref 3.6–5.5)
PROT SERPL-MCNC: 7.5 G/DL (ref 6–8.2)
RBC # BLD AUTO: 5.27 M/UL (ref 4.7–6.1)
SCREENING LOC CITY HTCIT: NORMAL
SCREENING LOC STATE HTSTA: NORMAL
SCREENING LOCATION HTLOC: NORMAL
SMOKING HTSMO: NO
SODIUM SERPL-SCNC: 140 MMOL/L (ref 135–145)
SUBSCRIBER ID HTSID: NORMAL
TRIGL SERPL-MCNC: 63 MG/DL (ref 0–149)
WBC # BLD AUTO: 3.9 K/UL (ref 4.8–10.8)

## 2017-09-12 PROCEDURE — 85025 COMPLETE CBC W/AUTO DIFF WBC: CPT

## 2017-09-12 PROCEDURE — S5190 WELLNESS ASSESSMENT BY NONPH: HCPCS

## 2017-09-12 PROCEDURE — 80061 LIPID PANEL: CPT

## 2017-09-12 PROCEDURE — 80053 COMPREHEN METABOLIC PANEL: CPT

## 2017-09-12 PROCEDURE — 36415 COLL VENOUS BLD VENIPUNCTURE: CPT

## 2017-09-12 PROCEDURE — 82947 ASSAY GLUCOSE BLOOD QUANT: CPT

## 2017-09-14 ENCOUNTER — TELEPHONE (OUTPATIENT)
Dept: MEDICAL GROUP | Facility: PHYSICIAN GROUP | Age: 52
End: 2017-09-14

## 2017-09-14 NOTE — TELEPHONE ENCOUNTER
ESTABLISHED PATIENT PRE-VISIT PLANNING     Note: Patient will not be contacted if there is no indication to call.     1.  Reviewed notes from the last few office visits within the medical group: Yes    2.  If any orders were placed at last visit or intended to be done for this visit (i.e. 6 mos follow-up), do we have Results/Consult Notes?        •  Labs - Labs were not ordered at last office visit.       •  Imaging - Imaging was not ordered at last office visit.       •  Referrals - No referrals were ordered at last office visit.    3. Is this appointment scheduled as a Hospital Follow-Up? No    4.  Immunizations were updated in [employer] using [employer]?: Yes       •  [employer] Recommendations: FLU, MMR , TD and VARICELLA (Chicken Pox)     5.  Patient is due for the following Health Maintenance Topics:   Health Maintenance Due   Topic Date Due   • COLON CANCER SCREENING ANNUAL FIT  08/15/2017   • IMM INFLUENZA (1) 09/01/2017       - Patient has completed FLU and TDAP Immunization(s) per [employer]. Chart has been updated.      6.  Patient was NOT informed to arrive 15 min prior to their scheduled appointment and bring in their medication bottles.

## 2017-09-15 ENCOUNTER — OFFICE VISIT (OUTPATIENT)
Dept: MEDICAL GROUP | Facility: PHYSICIAN GROUP | Age: 52
End: 2017-09-15
Payer: COMMERCIAL

## 2017-09-15 VITALS
OXYGEN SATURATION: 97 % | TEMPERATURE: 98.6 F | WEIGHT: 146 LBS | RESPIRATION RATE: 18 BRPM | HEART RATE: 82 BPM | DIASTOLIC BLOOD PRESSURE: 70 MMHG | HEIGHT: 69 IN | SYSTOLIC BLOOD PRESSURE: 116 MMHG | BODY MASS INDEX: 21.62 KG/M2

## 2017-09-15 DIAGNOSIS — Z86.73 HISTORY OF STROKE: ICD-10-CM

## 2017-09-15 DIAGNOSIS — Z23 NEED FOR VACCINATION: ICD-10-CM

## 2017-09-15 DIAGNOSIS — Z12.11 SCREENING FOR COLON CANCER: ICD-10-CM

## 2017-09-15 DIAGNOSIS — I69.398 HEMIPARESIS AND ALTERATION OF SENSATIONS AS LATE EFFECTS OF CEREBROVASCULAR ACCIDENT (HCC): ICD-10-CM

## 2017-09-15 DIAGNOSIS — I69.359 HEMIPARESIS AND ALTERATION OF SENSATIONS AS LATE EFFECTS OF CEREBROVASCULAR ACCIDENT (HCC): ICD-10-CM

## 2017-09-15 DIAGNOSIS — Z85.841 HISTORY OF EPENDYMOMA OF BRAIN: ICD-10-CM

## 2017-09-15 DIAGNOSIS — D72.9 ABNORMAL WHITE BLOOD CELL (WBC) COUNT: ICD-10-CM

## 2017-09-15 DIAGNOSIS — I10 ESSENTIAL HYPERTENSION: ICD-10-CM

## 2017-09-15 PROCEDURE — 90686 IIV4 VACC NO PRSV 0.5 ML IM: CPT | Performed by: FAMILY MEDICINE

## 2017-09-15 PROCEDURE — 99214 OFFICE O/P EST MOD 30 MIN: CPT | Mod: 25 | Performed by: FAMILY MEDICINE

## 2017-09-15 PROCEDURE — 90471 IMMUNIZATION ADMIN: CPT | Performed by: FAMILY MEDICINE

## 2017-09-15 RX ORDER — BENAZEPRIL HYDROCHLORIDE 10 MG/1
5 TABLET ORAL DAILY
Qty: 1 TAB | Refills: 0
Start: 2017-09-15 | End: 2017-11-10

## 2017-09-15 ASSESSMENT — PATIENT HEALTH QUESTIONNAIRE - PHQ9: CLINICAL INTERPRETATION OF PHQ2 SCORE: 0

## 2017-09-15 ASSESSMENT — PAIN SCALES - GENERAL: PAINLEVEL: NO PAIN

## 2017-09-15 NOTE — ASSESSMENT & PLAN NOTE
This is a new problem to me today. He mentions that Dr. Barajas ordered some tests and his white blood cell count was low. We reviewed his lab results for the last few years. He is not sure why his WBC count is low, but worries because his mother is on chemotherapy for leukemia.    Denies fever, chills, vomiting, abdominal pain or weight changes.    Results for PIERRE MARTINEZ (MRN [id number]) as of 9/15/2017 08:49   Ref. Range 1/8/2016 07:54 1/19/2017 08:45 9/12/2017 07:43   WBC Latest Ref Range: 4.8 - 10.8 K/uL 4.4 (L) 3.9 (L) 3.9 (L)

## 2017-09-15 NOTE — ASSESSMENT & PLAN NOTE
Stable. Monitoring BP at home. Currently taking benazepril 10mg daily as directed. The amlodipine was discontinued at his last appointment. Also taking baby aspirin. Denies lightheadedness, vision changes, headache, palpitations or leg swelling. He does have a history of stroke and ependymoma resection. This is stable.    He would like to discontinue the benazepril medication if possible.

## 2017-09-15 NOTE — PROGRESS NOTES
"Subjective:   Raul Martinez is a 52 y.o. male here today for follow-up hypertension and lab results review.    Essential hypertension  Stable. Monitoring BP at home. Currently taking benazepril 10mg daily as directed. The amlodipine was discontinued at his last appointment. Also taking baby aspirin. Denies lightheadedness, vision changes, headache, palpitations or leg swelling. He does have a history of stroke and ependymoma resection. This is stable.    He would like to discontinue the benazepril medication if possible.    Abnormal white blood cell (WBC) count  This is a new problem to me today. He mentions that Dr. Barajas ordered some tests and his white blood cell count was low. We reviewed his lab results for the last few years. He is not sure why his WBC count is low, but worries because his mother is on chemotherapy for leukemia.    Denies fever, chills, vomiting, abdominal pain or weight changes.    Results for RAUL MARTINEZ (MRN [id number]) as of 9/15/2017 08:49   Ref. Range 1/8/2016 07:54 1/19/2017 08:45 9/12/2017 07:43   WBC Latest Ref Range: 4.8 - 10.8 K/uL 4.4 (L) 3.9 (L) 3.9 (L)      Current medicines (including changes today)  Current Outpatient Prescriptions   Medication Sig Dispense Refill   • benazepril (LOTENSIN) 10 MG Tab Take 0.5 Tabs by mouth every day. 1 Tab 0   • gabapentin (NEURONTIN) 300 MG Cap Take 1 Cap by mouth 2 Times a Day. 60 Cap 0   • baclofen (LIORESAL) 10 MG Tab Take 1 Tab by mouth 3 times a day. 90 Tab 0     No current facility-administered medications for this visit.      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    ROS   See HPI. No chest pain, no shortness of breath, no abdominal pain.     Objective:     Physical Exam:  Blood pressure 116/70, pulse 82, temperature 37 °C (98.6 °F), resp. rate 18, height 1.753 m (5' 9\"), " weight 66.2 kg (146 lb), SpO2 97 %. Body mass index is 21.56 kg/m².   Constitutional: Alert, no distress, non-toxic appearance.  Skin: Warm, dry, good turgor, no rashes in visible areas.  Eye: Equal, round and reactive, conjunctiva clear, lids normal.  ENMT: +Right-sided hearing aide. TM's clear bilaterally, lips without lesions, good dentition, oropharynx clear.  Neck: Trachea midline, no masses, no thyromegaly. No cervical or supraclavicular lymphadenopathy.  Respiratory: Unlabored respiratory effort, lungs clear to auscultation, no wheezes, no ronchi.  Cardiovascular: Normal S1, S2, no murmur, no edema.  Abdomen: Soft, non-tender, no masses, no hepatosplenomegaly.  Psych: Alert and oriented x3, normal affect and mood.    Assessment and Plan:     1. Essential hypertension  BP continues to be very well controlled. Will decrease benazepril to 5mg. If BP remains <140/90, will discontinue medication.  - benazepril (LOTENSIN) 10 MG Tab; Take 0.5 Tabs by mouth every day.  Dispense: 1 Tab; Refill: 0    2. 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 History of stroke  4. History of ependymoma of brain  Chronic and stable. He does have some residual effects, but these are stable. See discussion above regarding BP. Following with Dr. Barajas.     5. Abnormal white blood cell (WBC) count  This is a new problem to me today. I reassured patient that his WBC count is just mildly low. He is asymptomatic. I offered to order additional testing to look for thyroid disorders, B12 deficiency and rheumatologic diseases, but he declines. He would like me to run it by the hematology team. I will discuss his lab results with them today.    6. Screening for colon cancer  Patient declines colonoscopy due to his history. FIT test ordered.  - OCCULT BLOOD FECES IMMUNOASSAY; Future    7. Need for vaccination  Influenza vaccine discussed and administered in office today.  - INFLUENZA  VACCINE QUAD INJ >3Y(PF)    Followup: Return in about 6 months (around 3/15/2018) for f/u HTN, short.         PLEASE NOTE: This dictation was created using voice recognition software. I have made every reasonable attempt to correct obvious errors, but I expect that there are errors of grammar and possibly content that I did not discover before finalizing the note.

## 2017-10-02 ENCOUNTER — NON-PROVIDER VISIT (OUTPATIENT)
Dept: MEDICAL GROUP | Facility: PHYSICIAN GROUP | Age: 52
End: 2017-10-02
Payer: COMMERCIAL

## 2017-10-02 VITALS — DIASTOLIC BLOOD PRESSURE: 93 MMHG | SYSTOLIC BLOOD PRESSURE: 122 MMHG

## 2017-10-02 PROCEDURE — 99999 PR NO CHARGE: CPT | Performed by: FAMILY MEDICINE

## 2017-10-02 NOTE — PROGRESS NOTES
Raul Hinojosa is a 52 y.o. male here for a non-provider visit for BP check     Vitals:    10/02/17 1108 10/02/17 1110   BP: 132/70 122/93     If abnormal, was an in office provider notified today? No  Routed to PCP? Yes    Advise pt to monitor at home for 1 week and if anything abnormal to give office a call.

## 2017-10-03 ENCOUNTER — HOSPITAL ENCOUNTER (OUTPATIENT)
Facility: MEDICAL CENTER | Age: 52
End: 2017-10-03
Attending: FAMILY MEDICINE
Payer: COMMERCIAL

## 2017-10-03 PROCEDURE — 82274 ASSAY TEST FOR BLOOD FECAL: CPT

## 2017-10-04 LAB — HEMOCCULT STL QL IA: NEGATIVE

## 2017-10-25 ENCOUNTER — HOSPITAL ENCOUNTER (OUTPATIENT)
Dept: LAB | Facility: MEDICAL CENTER | Age: 52
End: 2017-10-25
Attending: PSYCHIATRY & NEUROLOGY
Payer: COMMERCIAL

## 2017-10-25 LAB
ALBUMIN SERPL BCP-MCNC: 4.6 G/DL (ref 3.2–4.9)
ALBUMIN/GLOB SERPL: 1.4 G/DL
ALP SERPL-CCNC: 76 U/L (ref 30–99)
ALT SERPL-CCNC: 18 U/L (ref 2–50)
ANION GAP SERPL CALC-SCNC: 9 MMOL/L (ref 0–11.9)
AST SERPL-CCNC: 22 U/L (ref 12–45)
BASOPHILS # BLD AUTO: 0.9 % (ref 0–1.8)
BASOPHILS # BLD: 0.04 K/UL (ref 0–0.12)
BILIRUB SERPL-MCNC: 0.7 MG/DL (ref 0.1–1.5)
BUN SERPL-MCNC: 16 MG/DL (ref 8–22)
CALCIUM SERPL-MCNC: 10 MG/DL (ref 8.5–10.5)
CHLORIDE SERPL-SCNC: 104 MMOL/L (ref 96–112)
CO2 SERPL-SCNC: 29 MMOL/L (ref 20–33)
CREAT SERPL-MCNC: 0.82 MG/DL (ref 0.5–1.4)
EOSINOPHIL # BLD AUTO: 0.06 K/UL (ref 0–0.51)
EOSINOPHIL NFR BLD: 1.3 % (ref 0–6.9)
ERYTHROCYTE [DISTWIDTH] IN BLOOD BY AUTOMATED COUNT: 40.3 FL (ref 35.9–50)
GFR SERPL CREATININE-BSD FRML MDRD: >60 ML/MIN/1.73 M 2
GLOBULIN SER CALC-MCNC: 3.2 G/DL (ref 1.9–3.5)
GLUCOSE SERPL-MCNC: 85 MG/DL (ref 65–99)
HCT VFR BLD AUTO: 50.4 % (ref 42–52)
HGB BLD-MCNC: 16.2 G/DL (ref 14–18)
IMM GRANULOCYTES # BLD AUTO: 0.01 K/UL (ref 0–0.11)
IMM GRANULOCYTES NFR BLD AUTO: 0.2 % (ref 0–0.9)
LYMPHOCYTES # BLD AUTO: 1.85 K/UL (ref 1–4.8)
LYMPHOCYTES NFR BLD: 41.5 % (ref 22–41)
MCH RBC QN AUTO: 29.3 PG (ref 27–33)
MCHC RBC AUTO-ENTMCNC: 32.1 G/DL (ref 33.7–35.3)
MCV RBC AUTO: 91.1 FL (ref 81.4–97.8)
MONOCYTES # BLD AUTO: 0.39 K/UL (ref 0–0.85)
MONOCYTES NFR BLD AUTO: 8.7 % (ref 0–13.4)
NEUTROPHILS # BLD AUTO: 2.11 K/UL (ref 1.82–7.42)
NEUTROPHILS NFR BLD: 47.4 % (ref 44–72)
NRBC # BLD AUTO: 0 K/UL
NRBC BLD AUTO-RTO: 0 /100 WBC
PLATELET # BLD AUTO: 227 K/UL (ref 164–446)
PMV BLD AUTO: 9.7 FL (ref 9–12.9)
POTASSIUM SERPL-SCNC: 4.2 MMOL/L (ref 3.6–5.5)
PROT SERPL-MCNC: 7.8 G/DL (ref 6–8.2)
RBC # BLD AUTO: 5.53 M/UL (ref 4.7–6.1)
SODIUM SERPL-SCNC: 142 MMOL/L (ref 135–145)
WBC # BLD AUTO: 4.5 K/UL (ref 4.8–10.8)

## 2017-10-25 PROCEDURE — 85025 COMPLETE CBC W/AUTO DIFF WBC: CPT

## 2017-10-25 PROCEDURE — 80053 COMPREHEN METABOLIC PANEL: CPT

## 2017-10-25 PROCEDURE — 36415 COLL VENOUS BLD VENIPUNCTURE: CPT

## 2017-11-09 ENCOUNTER — TELEPHONE (OUTPATIENT)
Dept: MEDICAL GROUP | Facility: PHYSICIAN GROUP | Age: 52
End: 2017-11-09

## 2017-11-09 NOTE — TELEPHONE ENCOUNTER
ESTABLISHED PATIENT PRE-VISIT PLANNING     Note: Patient will not be contacted if there is no indication to call.     1.  Reviewed notes from the last few office visits within the medical group: Yes    2.  If any orders were placed at last visit or intended to be done for this visit (i.e. 6 mos follow-up), do we have Results/Consult Notes?        •  Labs - Labs ordered, completed on 10/03/17 and results are in chart.   Note: If patient appointment is for lab review and patient did not complete labs, check with provider if OK to reschedule patient until labs completed.       •  Imaging - Imaging was not ordered at last office visit.       •  Referrals - No referrals were ordered at last office visit.    3. Is this appointment scheduled as a Hospital Follow-Up? No    4.  Immunizations were updated in Epic using WebIZ?: Epic matches WebIZ       •  Web Iz Recommendations: MMR , TD and VARICELLA (Chicken Pox)     5.  Patient is due for the following Health Maintenance Topics:   There are no preventive care reminders to display for this patient.    - Patient has completed FLU and TDAP Immunization(s) per WebIZ. Chart has been updated.      6.  Patient was NOT informed to arrive 15 min prior to their scheduled appointment and bring in their medication bottles.

## 2017-11-10 ENCOUNTER — OFFICE VISIT (OUTPATIENT)
Dept: MEDICAL GROUP | Facility: PHYSICIAN GROUP | Age: 52
End: 2017-11-10
Payer: COMMERCIAL

## 2017-11-10 VITALS
TEMPERATURE: 98.6 F | DIASTOLIC BLOOD PRESSURE: 70 MMHG | HEART RATE: 88 BPM | RESPIRATION RATE: 16 BRPM | SYSTOLIC BLOOD PRESSURE: 100 MMHG | HEIGHT: 69 IN | BODY MASS INDEX: 22.36 KG/M2 | OXYGEN SATURATION: 93 % | WEIGHT: 151 LBS

## 2017-11-10 DIAGNOSIS — Z86.79 HISTORY OF HYPERTENSION: ICD-10-CM

## 2017-11-10 DIAGNOSIS — I69.398 HEMIPARESIS AND ALTERATION OF SENSATIONS AS LATE EFFECTS OF CEREBROVASCULAR ACCIDENT (HCC): ICD-10-CM

## 2017-11-10 DIAGNOSIS — R20.9 DISTURBANCE OF SKIN SENSATION: ICD-10-CM

## 2017-11-10 DIAGNOSIS — I69.359 HEMIPARESIS AND ALTERATION OF SENSATIONS AS LATE EFFECTS OF CEREBROVASCULAR ACCIDENT (HCC): ICD-10-CM

## 2017-11-10 DIAGNOSIS — L91.8 INFLAMED SKIN TAG: ICD-10-CM

## 2017-11-10 DIAGNOSIS — L82.0 INFLAMED SEBORRHEIC KERATOSIS: ICD-10-CM

## 2017-11-10 PROCEDURE — 99213 OFFICE O/P EST LOW 20 MIN: CPT | Mod: 25 | Performed by: FAMILY MEDICINE

## 2017-11-10 PROCEDURE — 17110 DESTRUCTION B9 LES UP TO 14: CPT | Performed by: FAMILY MEDICINE

## 2017-11-10 ASSESSMENT — PAIN SCALES - GENERAL: PAINLEVEL: NO PAIN

## 2017-11-10 NOTE — PATIENT INSTRUCTIONS
"Seborrheic Keratosis  Seborrheic keratosis is a common, noncancerous (benign) skin growth that can occur anywhere on the skin. It looks like \"stuck-on,\" waxy, rough, tan, brown, or black spots on the skin. These skin growths can be flat or raised. They are often called \"barnacles\" because of their pasted-on appearance. Usually, these skin growths appear in adulthood, around age 30, and increase in number as you age. They may also develop during pregnancy or following estrogen therapy. Many people may only have one growth appear in their lifetime, while some people may develop many growths.  CAUSES  It is unknown what causes these skin growths, but they appear to run in families.  SYMPTOMS  Seborrheic keratosis is often located on the face, chest, shoulders, back, or other areas. These growths are:  · Usually painless, but may become irritated and itchy.  · Yellow, brown, black, or other colors.  · Slightly raised or have a flat surface.  · Sometimes rough or wart-like in texture.  · Often waxy on the surface.  · Round or oval-shaped.  · Sometimes \"stuck-on\" in appearance.  · Sometimes single, but there are usually many growths.  Any growth that bleeds, itches on a regular basis, becomes inflamed, or becomes irritated needs to be evaluated by a skin specialist (dermatologist).  DIAGNOSIS  Diagnosis is mainly based on the way the growths appear. In some cases, it can be difficult to tell this type of skin growth from skin cancer. A skin growth tissue sample (biopsy)  may be used to confirm the diagnosis.  TREATMENT  Most often, treatment is not needed because the skin growths are benign. If the skin growth is irritated easily by clothing or jewelry, causing it to scab or bleed, treatment may be recommended. Patients may also choose to have the growths removed because they do not like their appearance. Most commonly, these growths are treated with cryosurgery.  In cryosurgery, liquid nitrogen is applied to \"freeze\" the " growth. The growth usually falls off within a matter of days. A blister may form and dry into a scab that will also fall off. After the growth or scab falls off, it may leave a dark or light spot on the skin. This color may fade over time, or it may remain permanent on the skin.  HOME CARE INSTRUCTIONS  If the skin growths are treated with cryosurgery, the treated area needs to be kept clean with water and soap.  SEEK MEDICAL CARE IF:  · You have questions about these growths or other skin problems.  · You develop new symptoms, including:  ¨ A change in the appearance of the skin growth.  ¨ New growths.  ¨ Any bleeding, itching, or pain in the growths.  ¨ A skin growth that looks similar to seborrheic keratosis.     This information is not intended to replace advice given to you by your health care provider. Make sure you discuss any questions you have with your health care provider.     Document Released: 01/20/2012 Document Revised: 03/11/2013 Document Reviewed: 01/20/2012  [employer] Patient Education ©2016 [employer]

## 2017-11-10 NOTE — PROGRESS NOTES
"Subjective:   Raul Hinojosa is a 52 y.o. male here today for follow-up blood pressure and skin concerns.    Blood pressure  Blood pressure today is at the lower end of normal. Patient denies any symptoms. No dizziness, lightheadedness, vision changes or headaches. He has stopped taking benazepril since his last appointment as his blood pressures have been normal at home and also here in the office when he came in for a nurse visit. He tells me that at home his blood pressures are in the \"120s over   80s range.\" He is happy that he does not have to take blood pressure medication any longer.    Skin concerns  Patient had sent me a [employer] chart message a couple of weeks ago with a picture of a mole that had been developing on his back. The picture was blurry and I instructed him to come to the office. He tells me that half of the lesion had \"fallen off.\" He does have a few other spots on his chest and back that he would like me to look at as they are itchy and bothersome to him. No personal or family history of skin cancer.    Pain on forehead  Patient reports that he has had some pain around his right eyebrow. It has been there since his stroke. He also will get pain on the right side of his face from time to time if he pushes himself too hard. For these pains, he will take gabapentin or baclofen as needed. The pain around his right eyebrow has not worsened recently, but he would just like to let me know about it. No new injury or trauma. No headaches.    Medication Sig Dispense Refill   • gabapentin (NEURONTIN) 300 MG Cap Take 1 Cap by mouth 2 Times a Day. 60 Cap 0   • baclofen (LIORESAL) 10 MG Tab Take 1 Tab by mouth 3 times a day. 90 Tab 0     No current facility-administered medications for this visit.      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    ROS   See " "HPI. No chest pain, no shortness of breath, no abdominal pain.     Objective:     Physical Exam:  Blood pressure 100/70, pulse 88, temperature 37 °C (98.6 °F), resp. rate 16, height 1.753 m (5' 9\"), weight 68.5 kg (151 lb), SpO2 93 %. Body mass index is 22.3 kg/m².   Constitutional: Alert, no distress, non-toxic appearance.  Skin: Warm, dry, good turgor, no rashes in visible areas. Small skin tag on left chest with surrounding erythema. There are five hyperpigmented, hyperkeratotic and elevated lesions on his chest and back consistent with seborrheic keratoses.  Eye: Equal, round and reactive, conjunctiva clear, lids normal.  ENMT: Lips without lesions, good dentition, oropharynx clear.  Neck: Trachea midline, no masses, no thyromegaly.  Respiratory: Unlabored respiratory effort, no cough.  Psych: Alert and oriented x3, normal affect and mood.    Assessment and Plan:     1. History of hypertension  Blood pressure stable off medications. He is asymptomatic. Continue to monitor off treatment.    2. 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 This has been present since his stroke. Well controlled with gabapentin and baclofen. Continue to monitor. May consider trial of carbamazepine in the future.    3. Inflamed seborrheic keratosis  4. Inflamed skin tag  5. Disturbance of skin sensation  This is a new problem. Lesions are consistent with benign skin tag and seborrheic keratoses. They are bothersome to the patient and do appear inflamed on exam. We discussed treatment with cryotherapy and he would like to proceed. See procedure note below.    CRYOTHERAPY:  Discussed risks and benefits of cryotherapy. Patient verbally agreed. Three applications of liquid nitrogen were applied to 1 skin tag lesion on left side of chest and 5 seborrheic keratoses lesions on trunk. Patient tolerated procedure well. Aftercare and return " precaution instructions given.    Followup: As needed         PLEASE NOTE: This dictation was created using voice recognition software. I have made every reasonable attempt to correct obvious errors, but I expect that there are errors of grammar and possibly content that I did not discover before finalizing the note.

## 2018-03-06 ENCOUNTER — OFFICE VISIT (OUTPATIENT)
Dept: MEDICAL GROUP | Facility: PHYSICIAN GROUP | Age: 53
End: 2018-03-06
Payer: COMMERCIAL

## 2018-03-06 VITALS
HEART RATE: 67 BPM | DIASTOLIC BLOOD PRESSURE: 68 MMHG | RESPIRATION RATE: 14 BRPM | SYSTOLIC BLOOD PRESSURE: 112 MMHG | TEMPERATURE: 98.1 F | HEIGHT: 69 IN | OXYGEN SATURATION: 98 % | WEIGHT: 151 LBS | BODY MASS INDEX: 22.36 KG/M2

## 2018-03-06 DIAGNOSIS — H00.012 HORDEOLUM EXTERNUM OF RIGHT LOWER EYELID: ICD-10-CM

## 2018-03-06 PROBLEM — H00.12 CHALAZION OF RIGHT LOWER EYELID: Status: ACTIVE | Noted: 2018-03-06

## 2018-03-06 PROCEDURE — 99214 OFFICE O/P EST MOD 30 MIN: CPT | Performed by: PHYSICIAN ASSISTANT

## 2018-03-06 RX ORDER — ERYTHROMYCIN 5 MG/G
1 OINTMENT OPHTHALMIC EVERY 4 HOURS PRN
Qty: 1 TUBE | Refills: 0 | Status: SHIPPED | OUTPATIENT
Start: 2018-03-06 | End: 2018-12-03

## 2018-03-06 ASSESSMENT — PAIN SCALES - GENERAL: PAINLEVEL: NO PAIN

## 2018-03-06 NOTE — ASSESSMENT & PLAN NOTE
Patient states 3 days ago he developed erythema of right lower eyelid and soon after developed a red, painful lump with swelling near the edge of the eyelid. States he has been doing intermittent warm compresses but admits to not be in persistent. States wife was recently diagnosed with a hordeolum and was treated with Polymyxin B Sulfates and Dematheasone ophthalmic solution. States for the past 2 days has been using wife's prescribed medication. One drop 3 times a day. States symptoms are unchanged since using wife's prescribed ophthalmic solution. Patient states he has a positive medical history for a brain tumor that was around 7 years ago. States he suffered from a stroke. Surgical procedure. States post surgical procedure he has experienced a sensation of eyelids irritating his eye. States since developing stye symptoms have exacerbated. Admits to intermittent polio eye discharge ×1 day. Denies vision changes. Admits area is tender to touch.

## 2018-04-24 ENCOUNTER — OFFICE VISIT (OUTPATIENT)
Dept: MEDICAL GROUP | Facility: PHYSICIAN GROUP | Age: 53
End: 2018-04-24
Payer: COMMERCIAL

## 2018-04-24 VITALS
WEIGHT: 151 LBS | TEMPERATURE: 98.1 F | OXYGEN SATURATION: 98 % | BODY MASS INDEX: 22.36 KG/M2 | HEIGHT: 69 IN | SYSTOLIC BLOOD PRESSURE: 108 MMHG | RESPIRATION RATE: 16 BRPM | HEART RATE: 79 BPM | DIASTOLIC BLOOD PRESSURE: 78 MMHG

## 2018-04-24 DIAGNOSIS — R09.82 POST-NASAL DRIP: ICD-10-CM

## 2018-04-24 DIAGNOSIS — G89.29 CHRONIC RIGHT SHOULDER PAIN: ICD-10-CM

## 2018-04-24 DIAGNOSIS — R05.9 COUGH: ICD-10-CM

## 2018-04-24 DIAGNOSIS — Z98.890 STATUS POST REPAIR OF GLENOID LABRUM: ICD-10-CM

## 2018-04-24 DIAGNOSIS — M25.511 CHRONIC RIGHT SHOULDER PAIN: ICD-10-CM

## 2018-04-24 PROBLEM — J06.9 ACUTE URI: Status: ACTIVE | Noted: 2018-04-24

## 2018-04-24 PROCEDURE — 99214 OFFICE O/P EST MOD 30 MIN: CPT | Performed by: PHYSICIAN ASSISTANT

## 2018-04-24 RX ORDER — BENZONATATE 100 MG/1
100 CAPSULE ORAL 3 TIMES DAILY PRN
Qty: 60 CAP | Refills: 0 | Status: SHIPPED | OUTPATIENT
Start: 2018-04-24 | End: 2018-12-03

## 2018-04-24 RX ORDER — AZITHROMYCIN 250 MG/1
TABLET, FILM COATED ORAL
Qty: 6 TAB | Refills: 0 | Status: SHIPPED | OUTPATIENT
Start: 2018-04-24 | End: 2018-12-03

## 2018-04-24 ASSESSMENT — PAIN SCALES - GENERAL: PAINLEVEL: NO PAIN

## 2018-04-24 NOTE — ASSESSMENT & PLAN NOTE
Chronic. Patient states for one year he's been experiencing a constant low-grade aching pain that can develop into a sharp pain of right shoulder. Admits to a positive surgical history for right labrum repair 20+ years ago. States symptoms are similar to symptoms prior to surgical intervention. States pain symptoms are aggravated with movement and alleviated with rest. States sitting with right arm rested at waist level aggravate symptoms. States pain on a scale from 1-10 is a 6-7. States he has tried Advil on one occasion with mild alleviation of symptoms. States he still has full range of motion of upper extremities but feels pain when doing so. Denies muscle atrophy, muscle weakness, numbness/tingling, decreased  strength.

## 2018-04-24 NOTE — ASSESSMENT & PLAN NOTE
2-3 days of illness including nasal congestion, rhinorrhea, post nasal drip and a dry non productive cough. Denies fever, chills, myalgias, or throat, hearing loss, tinnitus, ear pain, skin rash, shortness of breath, nausea, vomiting or abdominal pain. +Similarly ill exposures. States wife was recently diagnosed with bronchitis. Positive medical history for environmental and seasonal allergies. States he's been taking over-the-counter allergy medication but is unable to recall name of medication. States he has also been drinking home remedy tea with mild alleviation of symptoms. Denies sleep deprivation due to cough symptoms. Medical history negative for recurrent OM, tonsillitis, sinusitis, asthma, bronchitis, pneumonia. Patient states he will be traveling to [place] this upcoming weekend and is concerned about symptoms developing into a bacterial infection.  He  reports that he has never smoked. He has never used smokeless tobacco.

## 2018-04-25 NOTE — PROGRESS NOTES
Chief Complaint   Patient presents with   • Nasal Congestion     nasal/chest congestion x 2 days       HISTORY OF PRESENT ILLNESS: Raul Hinojosa is an established 53 y.o. male here to discuss the evaluation and management of:    Post-nasal drip  2-3 days of illness including nasal congestion, rhinorrhea, post nasal drip and a dry non productive cough. Denies fever, chills, myalgias, or throat, hearing loss, tinnitus, ear pain, skin rash, shortness of breath, nausea, vomiting or abdominal pain. +Similarly ill exposures. States wife was recently diagnosed with bronchitis. Positive medical history for environmental and seasonal allergies. States he's been taking over-the-counter allergy medication but is unable to recall name of medication. States he has also been drinking home remedy tea with mild alleviation of symptoms. Denies sleep deprivation due to cough symptoms. Medical history negative for recurrent OM, tonsillitis, sinusitis, asthma, bronchitis, pneumonia. Patient states he will be traveling to [place] this upcoming weekend and is concerned about symptoms developing into a bacterial infection. He  reports that he has never smoked. He has never used smokeless tobacco.    Chronic right shoulder pain  Chronic. Patient states for one year he's been experiencing a constant low-grade aching pain that can develop into a sharp pain of right shoulder. Admits to a positive surgical history for right labrum repair 20+ years ago. States symptoms are similar to symptoms prior to surgical intervention. States pain symptoms are aggravated with movement and alleviated with rest. States sitting with right arm rested at waist level aggravate symptoms. States pain on a scale from 1-10 is a 6-7. States he has tried Advil on one occasion with mild alleviation of symptoms. States he still has full range of motion of upper extremities but feels pain when doing so. Denies muscle atrophy, muscle weakness, numbness/tingling,  decreased  strength.        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 6 Tab 0   • benzonatate (TESSALON) 100 MG Cap Take 1 Cap by mouth 3 times a day as needed for Cough. 60 Cap 0   • erythromycin 5 MG/GM Ointment Place 1 Dose in right eye every four hours as needed. 1 Tube 0   • gabapentin (NEURONTIN) 300 MG Cap Take 1 Cap by mouth 2 Times a Day. 60 Cap 0   • baclofen (LIORESAL) 10 MG Tab Take 1 Tab by mouth 3 times a day. 90 Tab 0     No current facility-administered medications for this visit.        Social History   Substance Use Topics   • Smoking status: Never Smoker   • Smokeless tobacco: Never Used   • Alcohol use No       Family Status   Relation Status   • Mother Alive   • Father Alive   • Sister Alive   • Sister Alive     Family History   Problem Relation Age of Onset   • Cancer Mother      brain tumor, chronic leukemia   • Hypertension Father    • Hypertension Sister        ROS:  Review of Systems   Constitutional: Negative for fever, chills, weight loss  "and malaise/fatigue.   HENT: Negative for ear pain, nosebleeds,  sore throat and neck pain.  Positive for nasal congestion, postnasal drip, rhinorrhea.  Eyes: Negative for blurred vision.   Respiratory: Negative for cough, sputum production, shortness of breath and wheezing.   for nonproductive cough.  Cardiovascular: Negative for chest pain, palpitations, orthopnea and leg swelling.   Gastrointestinal: Negative for heartburn, nausea, vomiting and abdominal pain.   Genitourinary: Negative for dysuria, urgency and frequency.   Musculoskeletal: Negative for back pain. Positive for right shoulder pain and myalgias.  Skin: Negative for rash and itching.   Neurological: Negative for dizziness, tingling, tremors, sensory change, focal weakness and headaches.   Endo/Heme/Allergies: Does not bruise/bleed easily.   Psychiatric/Behavioral: Negative for depression, suicidal ideas and memory loss.  The patient is not nervous/anxious and does not have insomnia.    All other systems reviewed and are negative except as in HPI.    Exam: Blood pressure 108/78, pulse 79, temperature 36.7 °C (98.1 °F), resp. rate 16, height 1.753 m (5' 9\"), weight 68.5 kg (151 lb), SpO2 98 %. Body mass index is 22.3 kg/m².  General: Normal appearing. No distress.  HEENT: Normocephalic. Eyes conjunctiva clear lids without ptosis, pupils equal and reactive to light accommodation, ears normal shape and contour, canals are clear bilaterally, tympanic membranes are benign, nasal mucosa benign, oropharynx is without erythema, edema or exudates.   Neck: Supple without JVD or bruit. Thyroid is not enlarged.  Pulmonary: Clear to ausculation.  Normal effort. No rales, ronchi, or wheezing.  Cardiovascular: Regular rate and rhythm without murmur. Carotid and radial pulses are intact and equal bilaterally.  Abdomen: Soft, nontender, nondistended. Normal bowel sounds. Liver and spleen are not palpable  Neurologic: Grossly nonfocal.  Cranial nerves are " normal. DTR's normal and symmetric.  Lymph: No cervical, supraclavicular or axillary lymph nodes are palpable  Skin: Warm and dry.  No rashes or suspicious skin lesions.  Musculoskeletal: Normal gait. No extremity cyanosis, clubbing, or edema. Positive for empty can test of right arm. Bicipital groove is tender to palpation. Positive for mild pain with abduction above 90 degrees. Negative for drop arm sign. Positive Neer test. Negative gerbers test. Positive for cross body adduction.  strength equal bilaterally 5/5. Mild decreased range of motion of right upper extremity.  Psych: Normal mood and affect. Alert and oriented x3. Judgment and insight is normal.    Medical decision-making and discussion:  1. Post-nasal drip  2. Cough    1. Educated patient that on natural course of benign viral URI's.       Delayed antibiotic prescription if symptoms worsen over last 24-48 hours. Advised patient to only use antibiotic prescription if he develops worsening of symptoms.             Discussed postnasal drip with patient in great detail.  2. Twice daily use of nasal saline rinse or Neti-Pot encouraged. Can use over-the-counter Flonase.  3. OTC anti-pyretics and decongestants as needed.  4. Follow-up for worsening symptoms, difficulty breathing, lack of expected recovery, or should new symptoms or problems arise.    - azithromycin (ZITHROMAX) 250 MG Tab; Take 2 tablets on day 1 and then 1 tablet on days 2-5.  Dispense: 6 Tab; Refill: 0  - benzonatate (TESSALON) 100 MG Cap; Take 1 Cap by mouth 3 times a day as needed for Cough.  Dispense: 60 Cap; Refill: 0    3. Chronic right shoulder pain  4. Status post repair of glenoid labrum  Chronic condition. Symptoms are gradually worsening. Patient is inquiring about MRI shoulder. Patient has a positive surgical history for right labrum repair. Symptoms are similar to past symptoms prior to surgical intervention. A MRI of right shoulder has been ordered to further evaluate  patient. Patient will be contacted with results.  Advised patient to stretch and use over-the-counter NSAIDs as needed.      - MR-SHOULDER-W/O RIGHT; Future      Please note that this dictation was created using voice recognition software. I have made every reasonable attempt to correct obvious errors, but I expect that there are errors of grammar and possibly content that I did not discover before finalizing the note.      Return if symptoms worsen or fail to improve.

## 2018-05-15 ENCOUNTER — APPOINTMENT (OUTPATIENT)
Dept: RADIOLOGY | Facility: MEDICAL CENTER | Age: 53
End: 2018-05-15
Attending: NEUROLOGICAL SURGERY
Payer: COMMERCIAL

## 2018-06-06 ENCOUNTER — HOSPITAL ENCOUNTER (OUTPATIENT)
Dept: RADIOLOGY | Facility: MEDICAL CENTER | Age: 53
End: 2018-06-06
Attending: NEUROLOGICAL SURGERY
Payer: COMMERCIAL

## 2018-06-06 ENCOUNTER — HOSPITAL ENCOUNTER (OUTPATIENT)
Dept: RADIOLOGY | Facility: MEDICAL CENTER | Age: 53
End: 2018-06-06
Attending: PHYSICIAN ASSISTANT
Payer: COMMERCIAL

## 2018-06-06 DIAGNOSIS — Z98.890 STATUS POST REPAIR OF GLENOID LABRUM: ICD-10-CM

## 2018-06-06 DIAGNOSIS — M25.511 CHRONIC RIGHT SHOULDER PAIN: ICD-10-CM

## 2018-06-06 DIAGNOSIS — G89.29 CHRONIC RIGHT SHOULDER PAIN: ICD-10-CM

## 2018-06-06 DIAGNOSIS — D43.1: ICD-10-CM

## 2018-06-06 PROCEDURE — 72156 MRI NECK SPINE W/O & W/DYE: CPT

## 2018-06-06 PROCEDURE — A9579 GAD-BASE MR CONTRAST NOS,1ML: HCPCS | Performed by: PHYSICIAN ASSISTANT

## 2018-06-06 PROCEDURE — 77002 NEEDLE LOCALIZATION BY XRAY: CPT | Mod: RT

## 2018-06-06 PROCEDURE — 70553 MRI BRAIN STEM W/O & W/DYE: CPT

## 2018-06-06 PROCEDURE — 700117 HCHG RX CONTRAST REV CODE 255: Performed by: PHYSICIAN ASSISTANT

## 2018-06-06 PROCEDURE — 700117 HCHG RX CONTRAST REV CODE 255: Performed by: NEUROLOGICAL SURGERY

## 2018-06-06 PROCEDURE — A9577 INJ MULTIHANCE: HCPCS | Performed by: NEUROLOGICAL SURGERY

## 2018-06-06 PROCEDURE — 73222 MRI JOINT UPR EXTREM W/DYE: CPT | Mod: RT

## 2018-06-06 RX ADMIN — IOHEXOL 5 ML: 300 INJECTION, SOLUTION INTRAVENOUS at 11:40

## 2018-06-06 RX ADMIN — GADODIAMIDE 1 ML: 287 INJECTION INTRAVENOUS at 11:40

## 2018-06-06 RX ADMIN — GADOBENATE DIMEGLUMINE 10 ML: 529 INJECTION, SOLUTION INTRAVENOUS at 12:45

## 2018-06-06 NOTE — PROCEDURES
EXAMINATION:  6/6/2018 11:12 AM    HISTORY/REASON FOR EXAM:  SHOULDER PAIN.         TECHNIQUE/EXAM DESCRIPTION: Contrast injection prior to MRI scan.    COMPARISON:  None    FLUOROSCOPY TIME: 0.6 minutes    FINDINGS:  The procedure and its risks were explained the patient.  The patient accepted these risks and agreed to the procedure. The appropriate shoulder was marked with patient agreement. A timeout was performed.    Utilizing sterile technique and fluoroscopic guidance, 12 cc of contrast material was injected into the left shoulder.  This consisted of a mixture of 7 cc of iodinated contrast material, 13 cc of saline, and .1 cc of gadolinium.    The patient tolerated the procedure well.    Total fluoroscopic images: 1 fluoroscopic images obtained.    IMPRESSION:  Contrast injection prior to MRI scan of the shoulder.

## 2018-06-07 DIAGNOSIS — S43.431A SUPERIOR GLENOID LABRUM LESION OF RIGHT SHOULDER, INITIAL ENCOUNTER: ICD-10-CM

## 2018-06-08 ENCOUNTER — TELEPHONE (OUTPATIENT)
Dept: MEDICAL GROUP | Facility: PHYSICIAN GROUP | Age: 53
End: 2018-06-08

## 2018-06-08 NOTE — TELEPHONE ENCOUNTER
I spoke with patient and informed him referral has not been processed yet therefore we have no information on who he will be seeing.  I did inform him that if he prefers to see a certain provider he can contact our office and let us know.

## 2018-06-08 NOTE — TELEPHONE ENCOUNTER
VOICEMAIL  1. Caller Name: Raul Hinojosa                        Call Back Number: [phone] (home) [phone] (work)      2. Message: Called and left patient a message to call back to get lab results. LM     3. Patient approves office to leave a detailed voicemail/MyChart message: N\A

## 2018-06-08 NOTE — TELEPHONE ENCOUNTER
----- Message from Rosa Osman P.A.-C. sent at 6/7/2018  6:09 PM PDT -----  Please call patient. I have reviewed patient's MRI of right shoulder results. Due to multiple abnormalities patient has been referred to orthopedics for further evaluation symptoms.      If patient has questions about MRI feel free to contact us.    Thank you,    Lucina MARAVILLA

## 2018-06-08 NOTE — TELEPHONE ENCOUNTER
VOICEMAIL  1. Caller Name: Raul                      Call Back Number: [phone]      2. Message: Patient LVM questioning who he would be seeing for Ortho.      3. Patient approves office to leave a detailed voicemail/MyChart message:yes

## 2018-06-15 DIAGNOSIS — Z85.841 HISTORY OF EPENDYMOMA OF BRAIN: ICD-10-CM

## 2018-06-15 DIAGNOSIS — M43.10 SPONDYLOLISTHESIS, UNSPECIFIED SPINAL REGION: ICD-10-CM

## 2018-07-17 NOTE — PROGRESS NOTES
Chief Complaint   Patient presents with   • Blepharitis     Pt concerned about pink eye or sty in right eye. Red and slightyly swollen x 3 days. Pt states he's been using spouses neomycin eye drops.       HISTORY OF PRESENT ILLNESS: Raul Hinojosa is an established 53 y.o. male here to discuss the evaluation and management of:    Hordeolum externum of right lower eyelid  Patient states 3 days ago he developed erythema of right lower eyelid and soon after developed a red, painful lump with swelling near the edge of the eyelid. States he has been doing intermittent warm compresses but admits to not be in persistent. States wife was recently diagnosed with a hordeolum and was treated with Polymyxin B Sulfates and Dematheasone ophthalmic solution. States for the past 2 days has been using wife's prescribed medication. One drop 3 times a day. States symptoms are unchanged since using wife's prescribed ophthalmic solution. Patient states he has a positive medical history for a brain tumor that was around 7 years ago. States he suffered from a stroke. Surgical procedure. States post surgical procedure he has experienced a sensation of eyelids irritating his eye. States since developing stye symptoms have exacerbated. Admits to intermittent polio eye discharge ×1 day. Denies vision changes. Admits area is tender to touch.          Patient Active Problem List    Diagnosis Date Noted   • Hordeolum externum of right lower eyelid 03/06/2018   • Inflamed seborrheic keratosis 11/10/2017   • Inflamed skin tag 11/10/2017   • Abnormal white blood cell (WBC) count 09/15/2017   • History of ependymoma of brain 01/06/2017   • History of stroke 02/09/2015   • Hemiparesis and alteration of sensations as late effects of cerebrovascular accident (CMS-[facility]) 02/09/2015   • Barany's nystagmus 11/14/2011   • Vestibular disorder 11/14/2011   • Hearing loss 08/01/2011   • PFO (patent foramen ovale) 06/07/2011   • Martinez's esophagus with  esophagitis 02/23/2011   • History of hypertension 01/20/2011   • BPH (benign prostatic hypertrophy) 01/20/2011   • Spondylolisthesis 01/20/2011       Allergies:Morphine    Current Outpatient Prescriptions   Medication Sig Dispense Refill   • erythromycin 5 MG/GM Ointment Place 1 Dose in right eye every four hours as needed. 1 Tube 0   • gabapentin (NEURONTIN) 300 MG Cap Take 1 Cap by mouth 2 Times a Day. 60 Cap 0   • baclofen (LIORESAL) 10 MG Tab Take 1 Tab by mouth 3 times a day. 90 Tab 0     No current facility-administered medications for this visit.        Social History   Substance Use Topics   • Smoking status: Never Smoker   • Smokeless tobacco: Never Used   • Alcohol use No      Comment: occ 5-6 drinks per week       Family Status   Relation Status   • Mother Alive   • Father Alive   • Sister Alive   • Sister Alive     Family History   Problem Relation Age of Onset   • Cancer Mother      brain tumor, chronic leukemia   • Hypertension Father    • Hypertension Sister        ROS:  Review of Systems   Constitutional: Negative for fever, chills, weight loss and malaise/fatigue.   HENT: Negative for ear pain, nosebleeds, congestion, sore throat and neck pain.  Positive for erythematous, swollen right lower eyelid with small raised mass. Right lower eyelid is tender to palpation.   Eyes: Negative for blurred vision. Positive for erythematous, swollen painful bump  on lower eyelid.  Respiratory: Negative for cough, sputum production, shortness of breath and wheezing.    Cardiovascular: Negative for chest pain, palpitations, orthopnea and leg swelling.   Gastrointestinal: Negative for heartburn, nausea, vomiting and abdominal pain.   Genitourinary: Negative for dysuria, urgency and frequency.   Musculoskeletal: Negative for myalgias, back pain and joint pain.   Skin: Negative for rash and itching.   Neurological: Negative for dizziness, tingling, tremors, sensory change, focal weakness and headaches.  "  Endo/Heme/Allergies: Does not bruise/bleed easily.   Psychiatric/Behavioral: Negative for depression, suicidal ideas and memory loss.  The patient is not nervous/anxious and does not have insomnia.    All other systems reviewed and are negative except as in HPI.    Exam: Blood pressure 112/68, pulse 67, temperature 36.7 °C (98.1 °F), resp. rate 14, height 1.753 m (5' 9\"), weight 68.5 kg (151 lb), SpO2 98 %. Body mass index is 22.3 kg/m².  General: Normal appearing. No distress.  HEENT: Normocephalic. Eyes conjunctiva clear lids without ptosis, pupils equal and reactive to light accommodation, ears normal shape and contour, canals are clear bilaterally, tympanic membranes are benign, nasal mucosa benign, oropharynx is without erythema, edema or exudates.   Neck: Supple without JVD or bruit. Thyroid is not enlarged.  Pulmonary: Clear to ausculation.  Normal effort. No rales, ronchi, or wheezing.  Cardiovascular: Regular rate and rhythm without murmur. Carotid and radial pulses are intact and equal bilaterally.  Abdomen: Soft, nontender, nondistended. Normal bowel sounds. Liver and spleen are not palpable. No CVA tenderness with palpation.  Neurologic: Grossly nonfocal.  Cranial nerves are normal. LE DTR's normal and symmetric.  Lymph: No cervical, supraclavicular or axillary lymph nodes are palpable  Skin: Warm and dry.  No rashes or suspicious skin lesions.  Musculoskeletal: Normal gait. No extremity cyanosis, clubbing, or edema.  Psych: Normal mood and affect. Alert and oriented x3. Judgment and insight is normal.    Medical decision-making and discussion:  1. Hordeolum externum of right lower eyelid  During today's plan patient has been prescribed erythromycin 5 MG/EM ointment and advised to use medication every 4 hours. Advised patient to use warm compresses for 15 minutes at a time approximately four times per day. If symptoms do not side after 2 weeks will consider referring to ophthalmology.  Advised patient " to keep area dry and clean.    Follow-up for worsening symptoms,lack of expected recovery, or should new symptoms or problems arise.      -  erythromycin 5 MG/GM Ointment; Place 1 Dose in right eye every four hours as needed.  Dispense: 1 Tube; Refill: 0      Please note that this dictation was created using voice recognition software. I have made every reasonable attempt to correct obvious errors, but I expect that there are errors of grammar and possibly content that I did not discover before finalizing the note.        Return if symptoms worsen or fail to improve.   Alert and oriented to person, place and time

## 2018-08-02 ENCOUNTER — HOSPITAL ENCOUNTER (OUTPATIENT)
Dept: LAB | Facility: MEDICAL CENTER | Age: 53
End: 2018-08-02
Payer: COMMERCIAL

## 2018-08-02 LAB
BDY FAT % MEASURED: 23.7 %
BP DIAS: 80 MMHG
BP SYS: 120 MMHG
CHOLEST SERPL-MCNC: 180 MG/DL (ref 100–199)
DIABETES HTDIA: NO
EVENT NAME HTEVT: NORMAL
FASTING HTFAS: YES
GLUCOSE SERPL-MCNC: 88 MG/DL (ref 65–99)
HDLC SERPL-MCNC: 54 MG/DL
HYPERTENSION HTHYP: NO
LDLC SERPL CALC-MCNC: 110 MG/DL
SCREENING LOC CITY HTCIT: NORMAL
SCREENING LOC STATE HTSTA: NORMAL
SCREENING LOCATION HTLOC: NORMAL
SMOKING HTSMO: NO
SUBSCRIBER ID HTSID: NORMAL
TRIGL SERPL-MCNC: 80 MG/DL (ref 0–149)

## 2018-08-02 PROCEDURE — 36415 COLL VENOUS BLD VENIPUNCTURE: CPT

## 2018-08-02 PROCEDURE — 80061 LIPID PANEL: CPT

## 2018-08-02 PROCEDURE — S5190 WELLNESS ASSESSMENT BY NONPH: HCPCS

## 2018-08-02 PROCEDURE — 82947 ASSAY GLUCOSE BLOOD QUANT: CPT

## 2018-12-03 ENCOUNTER — OFFICE VISIT (OUTPATIENT)
Dept: INTERNAL MEDICINE | Facility: MEDICAL CENTER | Age: 53
End: 2018-12-03
Payer: COMMERCIAL

## 2018-12-03 VITALS
SYSTOLIC BLOOD PRESSURE: 120 MMHG | BODY MASS INDEX: 22.45 KG/M2 | OXYGEN SATURATION: 96 % | DIASTOLIC BLOOD PRESSURE: 85 MMHG | HEIGHT: 69 IN | TEMPERATURE: 98.3 F | WEIGHT: 151.6 LBS | HEART RATE: 90 BPM

## 2018-12-03 DIAGNOSIS — R05.9 COUGH: ICD-10-CM

## 2018-12-03 DIAGNOSIS — R09.82 POST-NASAL DISCHARGE: ICD-10-CM

## 2018-12-03 PROCEDURE — 99213 OFFICE O/P EST LOW 20 MIN: CPT | Mod: GE | Performed by: INTERNAL MEDICINE

## 2018-12-03 RX ORDER — AMLODIPINE BESYLATE AND BENAZEPRIL HYDROCHLORIDE 5; 10 MG/1; MG/1
CAPSULE ORAL
COMMUNITY
End: 2018-12-03

## 2018-12-03 RX ORDER — MONTELUKAST SODIUM 10 MG/1
10 TABLET ORAL DAILY
Qty: 30 TAB | Refills: 1 | Status: SHIPPED | OUTPATIENT
Start: 2018-12-03 | End: 2019-02-08

## 2018-12-03 RX ORDER — DOCUSATE SODIUM 100 MG/1
100 CAPSULE, LIQUID FILLED ORAL
COMMUNITY
End: 2018-12-03

## 2018-12-03 RX ORDER — FLUTICASONE PROPIONATE 50 MCG
1 SPRAY, SUSPENSION (ML) NASAL DAILY
Qty: 16 G | Refills: 1 | Status: SHIPPED | OUTPATIENT
Start: 2018-12-03 | End: 2019-02-08

## 2018-12-03 RX ORDER — CETIRIZINE HYDROCHLORIDE 10 MG/1
10 TABLET ORAL DAILY
Qty: 30 TAB | Refills: 1 | Status: SHIPPED | OUTPATIENT
Start: 2018-12-03 | End: 2019-02-08

## 2018-12-03 RX ORDER — BENZONATATE 100 MG/1
100 CAPSULE ORAL EVERY 4 HOURS PRN
Qty: 30 CAP | Refills: 1 | Status: SHIPPED | OUTPATIENT
Start: 2018-12-03 | End: 2019-02-08

## 2018-12-03 RX ORDER — GUAIFENESIN AND DEXTROMETHORPHAN HYDROBROMIDE 100; 10 MG/5ML; MG/5ML
10 SOLUTION ORAL EVERY 6 HOURS PRN
Qty: 840 ML | Refills: 1 | Status: SHIPPED | OUTPATIENT
Start: 2018-12-03 | End: 2019-02-08

## 2018-12-03 ASSESSMENT — PAIN SCALES - GENERAL: PAINLEVEL: 6=MODERATE PAIN

## 2018-12-03 NOTE — PROGRESS NOTES
Established Patient    Raul presents today with the following:    CC: scratchy throat, cough    HPI: Mr Hinojosa is a 52 YO male presented tot he clinic for the c/o scratchy throat, post nasal discharge, and cough x 15 days   Pt has a known h/o allergies gets worse in this time of the year. He take Loratidine daily for his allergy sxs. Not on intranasal steroid spray.   Denies fever, chills, HA Malaise. Non smoker, no chronic lung disease, received flu vaccine this season. No exposure to sick person.   Cough  Non productive, triggered by PND     Patient Active Problem List    Diagnosis Date Noted   • Post-nasal drip 04/24/2018   • Chronic right shoulder pain 04/24/2018   • Hordeolum externum of right lower eyelid 03/06/2018   • Inflamed seborrheic keratosis 11/10/2017   • Inflamed skin tag 11/10/2017   • Abnormal white blood cell (WBC) count 09/15/2017   • History of ependymoma of brain 01/06/2017   • History of stroke 02/09/2015   • Hemiparesis and alteration of sensations as late effects of cerebrovascular accident (HCC) 02/09/2015   • Barany's nystagmus 11/14/2011   • Vestibular disorder 11/14/2011   • Hearing loss 08/01/2011   • PFO (patent foramen ovale) 06/07/2011   • Martinez's esophagus with esophagitis 02/23/2011   • History of hypertension 01/20/2011   • BPH (benign prostatic hypertrophy) 01/20/2011   • Spondylolisthesis 01/20/2011       Current Outpatient Prescriptions   Medication Sig Dispense Refill   • benzonatate (TESSALON) 100 MG Cap Take 1 Cap by mouth every four hours as needed for Cough. 30 Cap 1   • Dextromethorphan-Guaifenesin (TUSSIN DM)  MG/5ML Syrup Take 10 mL by mouth every 6 hours as needed. 840 mL 1   • fluticasone (FLONASE) 50 MCG/ACT nasal spray Spray 1 Spray in nose every day. 16 g 1   • cetirizine (ZYRTEC) 10 MG Tab Take 1 Tab by mouth every day. 30 Tab 1   • montelukast (SINGULAIR) 10 MG Tab Take 1 Tab by mouth every day. 30 Tab 1   • gabapentin (NEURONTIN) 300 MG Cap Take 1  "Cap by mouth 2 Times a Day. 60 Cap 0   • baclofen (LIORESAL) 10 MG Tab Take 1 Tab by mouth 3 times a day. 90 Tab 0     No current facility-administered medications for this visit.        ROS: As per HPI. Additional pertinent systems as noted below.    As per HPI     /85 (BP Location: Right arm, Patient Position: Sitting)   Pulse 90   Temp 36.8 °C (98.3 °F) (Temporal)   Ht 1.753 m (5' 9\")   Wt 68.8 kg (151 lb 9.6 oz)   SpO2 96%   BMI 22.39 kg/m²     Physical Exam   Constitutional:  oriented to person, place, and time. No distress.   Eyes: Pupils are equal, round, and reactive to light. No scleral icterus.  Neck: Neck supple. No thyromegaly present.   Nose: pink mucosa, non boggy. Non sinus tenderness.   Cardiovascular: Normal rate, regular rhythm and normal heart sounds.  Exam reveals no gallop and no friction rub.  No murmur heard.  Pulmonary/Chest: Breath sounds normal. Chest wall is not dull to percussion.   Musculoskeletal:   no edema.   Lymphadenopathy: no cervical adenopathy  Neurological: alert and oriented to person, place, and time.   Skin: No cyanosis. Nails show no clubbing.      Note: I have reviewed all pertinent labs and diagnostic tests associated with this visit with specific comments listed under the assessment and plan below    Assessment and Plan    1. Post-nasal discharge  - with hx of known seasonal allergic rhinitis  - start flonase twice a day 1 squirt each nose  - switch to zyrtec from loratidine  - if sxs doesn't improve in 2 weeks, Singulair daily.   - avoid known allergen     2. Cough  - non productive, no red flags nor now  originating from upper RT, 2/2 PND  - tessalon, dextromethorphan for sxs relief.         Followup: Return if symptoms worsen or fail to improve.      Signed by: Karen Santos M.D.    "

## 2019-01-17 ENCOUNTER — OFFICE VISIT (OUTPATIENT)
Dept: MEDICAL GROUP | Facility: PHYSICIAN GROUP | Age: 54
End: 2019-01-17
Payer: COMMERCIAL

## 2019-01-17 VITALS
HEART RATE: 67 BPM | BODY MASS INDEX: 22.36 KG/M2 | HEIGHT: 69 IN | DIASTOLIC BLOOD PRESSURE: 80 MMHG | TEMPERATURE: 97.8 F | SYSTOLIC BLOOD PRESSURE: 116 MMHG | WEIGHT: 151 LBS | RESPIRATION RATE: 14 BRPM | OXYGEN SATURATION: 98 %

## 2019-01-17 DIAGNOSIS — D22.9 ATYPICAL MOLE: ICD-10-CM

## 2019-01-17 DIAGNOSIS — H00.14 CHALAZION OF LEFT UPPER EYELID: ICD-10-CM

## 2019-01-17 DIAGNOSIS — Z23 NEED FOR VACCINATION: ICD-10-CM

## 2019-01-17 DIAGNOSIS — Z12.11 SCREENING FOR COLON CANCER: ICD-10-CM

## 2019-01-17 PROBLEM — H00.11 CHALAZION OF RIGHT UPPER EYELID: Status: ACTIVE | Noted: 2019-01-17

## 2019-01-17 PROCEDURE — 99213 OFFICE O/P EST LOW 20 MIN: CPT | Performed by: PHYSICIAN ASSISTANT

## 2019-01-17 ASSESSMENT — PATIENT HEALTH QUESTIONNAIRE - PHQ9: CLINICAL INTERPRETATION OF PHQ2 SCORE: 0

## 2019-01-17 NOTE — PROGRESS NOTES
Chief Complaint   Patient presents with   • Eye Problem     LT eye, sore for a few days.  Pt thinks it is infected, no discharge       HISTORY OF PRESENT ILLNESS: Raul Hinojosa is an established 53 y.o. male here to discuss the evaluation and management of:    Chalazion of left upper eyelid  Acute.  Patient states 3 days ago experienced an erythematous, swollen upper left eyelid that was tender to palpation.  Denies vision changes.  Denies eye discharge or injected conjunctiva.  States yesterday he used some eyedrops (Polytrim) of his wife and is inquiring if he needs a prescription.  Denies upper respiratory infection symptoms.    Atypical mole  Patient states for several years he has had a mole on right suprapubic region and he tells me that mole has been recently changing in size.  States the texture of the mole also feels different.  Positive for irregular borders.  Negative for bleeding/pruritus/discharge/color changes.  Denies other suspicious lesions.  Denies using over-the-counter at home treatments alleviate symptoms.  Patient is inquiring about treatment options.        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 0.5 mL 1   • benzonatate (TESSALON) 100 MG Cap Take 1 Cap by mouth every four hours as needed for Cough. 30 Cap 1   • Dextromethorphan-Guaifenesin (TUSSIN DM)  MG/5ML Syrup Take 10 mL by mouth every 6 hours as needed. 840 mL 1   • fluticasone (FLONASE) 50 MCG/ACT nasal spray Spray 1 Spray in nose every day. 16 g 1   • cetirizine (ZYRTEC) 10 MG Tab Take 1 Tab by mouth every day. 30 Tab 1   • montelukast (SINGULAIR) 10 MG Tab Take 1 Tab by mouth every day. 30 Tab 1   • gabapentin (NEURONTIN) 300 MG Cap Take 1 Cap by mouth 2 Times a Day. 60 Cap 0   • baclofen (LIORESAL) 10 MG Tab Take 1 Tab by mouth 3 times a day. 90 Tab 0     No current facility-administered medications for this visit.        Social History   Substance Use Topics   • Smoking status: Never Smoker   • Smokeless tobacco: Never Used   • Alcohol use No       Family Status   Relation Status   • Mo Alive   • Fa Alive   • Sis Alive   • Sis Alive     Family History   Problem Relation Age of Onset   • Cancer Mother         brain tumor, chronic leukemia   • Hypertension Father    • Hypertension Sister        ROS:  Review of Systems   Constitutional: Negative for fever, chills, weight loss and malaise/fatigue.   HENT: Negative for ear pain, nosebleeds, congestion, sore throat and neck pain.    Eyes: Negative for blurred vision.  Positive for erythematous, swollen, tender left upper eyelid.  Respiratory: Negative for cough, sputum production, shortness of breath and wheezing.    Cardiovascular: Negative for chest pain, palpitations, orthopnea and leg swelling.   Gastrointestinal: Negative for heartburn, nausea, vomiting and abdominal pain.   Genitourinary: Negative for dysuria, urgency and frequency.   Musculoskeletal: Negative for myalgias, back  "pain and joint pain.   Skin: Negative for rash and itching.  Positive for atypical mole of suprapubic region.  Neurological: Negative for dizziness, tingling, tremors, sensory change, focal weakness and headaches.   Endo/Heme/Allergies: Does not bruise/bleed easily.   Psychiatric/Behavioral: Negative for depression, suicidal ideas and memory loss.  The patient is not nervous/anxious and does not have insomnia.    All other systems reviewed and are negative except as in HPI.    Exam: Blood pressure 116/80, pulse 67, temperature 36.6 °C (97.8 °F), resp. rate 14, height 1.753 m (5' 9\"), weight 68.5 kg (151 lb), SpO2 98 %. Body mass index is 22.3 kg/m².  General: Normal appearing. No distress.  HEENT: Normocephalic.  Left eyelid is erythematous, mildly swollen, and tender to palpation.  Negative for trauma/ecchymosis/abrasions.  Eyes conjunctiva clear lids without ptosis, pupils equal and reactive to light accommodation, ears normal shape and contour.  Neck: Supple without JVD or bruit. Thyroid is not enlarged.  Pulmonary: Clear to ausculation.  Normal effort. No rales, ronchi, or wheezing.  Cardiovascular: Regular rate and rhythm without murmur.   Abdomen: Soft, nontender, nondistended.   Neurologic: Grossly nonfocal.  Cranial nerves are normal.   Lymph: No cervical, supraclavicular or axillary lymph nodes are palpable  Skin: Warm and dry.  No rashes.  Positive for pea-sized brown irregular bordered mole of right suprapubic region.  Positive for dry flaking skin of mole.  Negative for cracking/bleeding/discharge or erythema.  No signs of infection.  Musculoskeletal: Normal gait. No extremity cyanosis, clubbing, or edema.  Psych: Normal mood and affect. Alert and oriented x3. Judgment and insight is normal.    Medical decision-making and discussion:  1. Chalazion of left upper eyelid  Advised patient to use warm compresses throughout the day.  Suggested taking over-the-counter anti-inflammatories as needed.  Discussed " with patient a prescription medication is not indicated at this time.  Continue to monitor.    Follow-up for worsening symptoms,lack of expected recovery, or should new symptoms or problems arise.    2. Atypical mole  Patient has been scheduled for a skin shave with his PCP.    3. Need for vaccination  Shingrix  prescription was provided to patient during today's appointment.  Advised patient to contact his medical insurance to discuss the cost of vaccinations.  - Zoster Vac Recomb Adjuvanted (SHINGRIX) 50 MCG Recon Susp; 0.5 mL by Intramuscular route Once for 1 dose.  Dispense: 0.5 mL; Refill: 1    4. Screening for colon cancer  Discussed importance of being screened for colon cancer with patient.  Patient would like to proceed with occult blood feces immunoassay.  Patient be contacted with results.  - OCCULT BLOOD FECES IMMUNOASSAY (FIT); Future      Please note that this dictation was created using voice recognition software. I have made every reasonable attempt to correct obvious errors, but I expect that there are errors of grammar and possibly content that I did not discover before finalizing the note.      Return if symptoms worsen or fail to improve.

## 2019-02-07 ENCOUNTER — HOSPITAL ENCOUNTER (OUTPATIENT)
Facility: MEDICAL CENTER | Age: 54
End: 2019-02-07
Attending: PHYSICIAN ASSISTANT
Payer: COMMERCIAL

## 2019-02-07 PROCEDURE — 82274 ASSAY TEST FOR BLOOD FECAL: CPT

## 2019-02-08 ENCOUNTER — OFFICE VISIT (OUTPATIENT)
Dept: MEDICAL GROUP | Facility: PHYSICIAN GROUP | Age: 54
End: 2019-02-08
Payer: COMMERCIAL

## 2019-02-08 VITALS
TEMPERATURE: 97.6 F | RESPIRATION RATE: 16 BRPM | DIASTOLIC BLOOD PRESSURE: 78 MMHG | WEIGHT: 148.6 LBS | OXYGEN SATURATION: 94 % | BODY MASS INDEX: 22.01 KG/M2 | SYSTOLIC BLOOD PRESSURE: 122 MMHG | HEIGHT: 69 IN | HEART RATE: 70 BPM

## 2019-02-08 DIAGNOSIS — L82.0 SEBORRHEIC KERATOSIS, INFLAMED: ICD-10-CM

## 2019-02-08 DIAGNOSIS — L91.8 INFLAMED SKIN TAG: ICD-10-CM

## 2019-02-08 DIAGNOSIS — D48.9 NEOPLASM OF UNCERTAIN BEHAVIOR: ICD-10-CM

## 2019-02-08 PROCEDURE — 99214 OFFICE O/P EST MOD 30 MIN: CPT | Performed by: FAMILY MEDICINE

## 2019-02-09 NOTE — PROGRESS NOTES
"Subjective:   Raul Hinojosa is a 54 y.o. male here today for nevi and possible skin biopsy.  He is unaccompanied for today's visit.    Raul's main concern today is a mole on his lower abdomen.  It has been there for several years, but recently has been increasing in size and darkening in color.  He has similar appearing lesions on his back.  Denies personal history of skin cancer.  He also has some skin tags on his sides that are itchy and bothersome to him.    He is worried about having a biopsy.  He has a significant past medical history of ependymoma of the brain and several surgical procedures.  He is hoping he does not need a biopsy.    Current medicines (including changes today)  Current Outpatient Prescriptions   Medication Sig Dispense Refill   • gabapentin (NEURONTIN) 300 MG Cap Take 1 Cap by mouth 2 Times a Day. 60 Cap 0   • baclofen (LIORESAL) 10 MG Tab Take 1 Tab by mouth 3 times a day. 90 Tab 0     No current facility-administered medications for this visit.      He  has a past medical history of Barany's nystagmus (11/14/2011); Brain tumor (HCC) (2011); Hypertension; Other specified disorder of intestines; Snoring; and Vestibular disorder (11/14/2011).    ROS   No chest pain, no shortness of breath, no abdominal pain.     Objective:     Physical Exam:  Blood pressure 122/78, pulse 70, temperature 36.4 °C (97.6 °F), resp. rate 16, height 1.753 m (5' 9\"), weight 67.4 kg (148 lb 9.6 oz), SpO2 94 %. Body mass index is 21.94 kg/m².   Constitutional: Alert, no distress, non-toxic appearance.  Skin: Warm, dry, good turgor, no rashes in visible areas. On lower abdomen there is a 0.8 x 1.0 x 0.2 cm hyperpigmented and hyperkeratotic papule.  On bilateral sides of trunk there are at least four flesh-colored pedunculated lesions with minimal surrounding erythema.  Eye: Equal, round and reactive, conjunctiva clear, lids normal.  ENMT: Lips without lesions, good dentition, moist mucous membranes.  Neck: Trachea " midline, no masses, no thyromegaly.   Respiratory: Unlabored respiratory effort, no cough.  Psych: Alert and oriented x3, normal affect and mood.    Assessment and Plan:     1. Neoplasm of uncertain behavior  2. Seborrheic keratosis, inflamed  3. Inflamed skin tag  These are new issues to me today.  We had a lengthy discussion regarding management options and agreed on cryotherapy.  If the lesions return, especially the one on his lower abdomen, will plan to perform a shave biopsy.  Patient is in agreement.  He tolerated the procedure well and there were no apparent complications.    Followup: Return in about 4 weeks (around 3/8/2019) for possible skin biopsy, long.         PLEASE NOTE: This dictation was created using voice recognition software. I have made every reasonable attempt to correct obvious errors, but I expect that there are errors of grammar and possibly content that I did not discover before finalizing the note.

## 2019-02-11 DIAGNOSIS — Z12.11 SCREENING FOR COLON CANCER: ICD-10-CM

## 2019-02-12 LAB — HEMOCCULT STL QL IA: NEGATIVE

## 2019-02-13 ENCOUNTER — TELEPHONE (OUTPATIENT)
Dept: MEDICAL GROUP | Facility: PHYSICIAN GROUP | Age: 54
End: 2019-02-13

## 2019-02-13 NOTE — TELEPHONE ENCOUNTER
----- Message from Rosa Osman P.A.-C. sent at 2/12/2019  6:00 PM PST -----  Please call patient. I have reviewed patient's lab work and results are negative for blood, will check again in 1 year.     Thank you,    Lucina MARAVILLA

## 2019-08-03 ENCOUNTER — HOSPITAL ENCOUNTER (OUTPATIENT)
Dept: LAB | Facility: MEDICAL CENTER | Age: 54
End: 2019-08-03
Payer: COMMERCIAL

## 2019-08-03 LAB
BDY FAT % MEASURED: 24.9 %
BP DIAS: 80 MMHG
BP SYS: 120 MMHG
CHOLEST SERPL-MCNC: 181 MG/DL (ref 100–199)
DIABETES HTDIA: NO
EVENT NAME HTEVT: NORMAL
FASTING HTFAS: YES
GLUCOSE SERPL-MCNC: 92 MG/DL (ref 65–99)
HDLC SERPL-MCNC: 57 MG/DL
HYPERTENSION HTHYP: NO
LDLC SERPL CALC-MCNC: 112 MG/DL
SCREENING LOC CITY HTCIT: NORMAL
SCREENING LOC STATE HTSTA: NORMAL
SCREENING LOCATION HTLOC: NORMAL
SMOKING HTSMO: NO
SUBSCRIBER ID HTSID: NORMAL
TRIGL SERPL-MCNC: 62 MG/DL (ref 0–149)

## 2019-08-03 PROCEDURE — 80061 LIPID PANEL: CPT

## 2019-08-03 PROCEDURE — 36415 COLL VENOUS BLD VENIPUNCTURE: CPT

## 2019-08-03 PROCEDURE — S5190 WELLNESS ASSESSMENT BY NONPH: HCPCS

## 2019-08-03 PROCEDURE — 82947 ASSAY GLUCOSE BLOOD QUANT: CPT

## 2019-09-14 ENCOUNTER — PATIENT MESSAGE (OUTPATIENT)
Dept: MEDICAL GROUP | Facility: PHYSICIAN GROUP | Age: 54
End: 2019-09-14

## 2019-09-14 DIAGNOSIS — Z91.89 AT RISK FOR INFECTION: ICD-10-CM

## 2019-09-14 DIAGNOSIS — Z71.84 TRAVEL ADVICE ENCOUNTER: ICD-10-CM

## 2019-09-15 RX ORDER — AZITHROMYCIN 250 MG/1
TABLET, FILM COATED ORAL
Qty: 6 TAB | Refills: 0 | Status: SHIPPED | OUTPATIENT
Start: 2019-09-15 | End: 2020-01-03

## 2019-09-15 NOTE — TELEPHONE ENCOUNTER
From: Raul Hinojosa  To: Mildred Vaz M.D.  Sent: 9/14/2019 7:42 AM PDT  Subject: Non-Urgent Medical Question    Hello Dr. Vaz,    I hope you are well.    My wife and I will be traveling to [place] and [place] October 19th and will be gone for 3 weeks. As a precautionary measure we like to take a z-pack for each of us in case we come down with something on long trips like this.  Savannah will be getting one from her doctor. I was wondering if you would be willing to prescribe one for me?    Kind regards,    Raul Hinojosa

## 2019-09-23 ENCOUNTER — HOSPITAL ENCOUNTER (OUTPATIENT)
Dept: LAB | Facility: MEDICAL CENTER | Age: 54
End: 2019-09-23
Attending: PSYCHIATRY & NEUROLOGY
Payer: COMMERCIAL

## 2019-09-23 LAB
ALBUMIN SERPL BCP-MCNC: 4.6 G/DL (ref 3.2–4.9)
ALBUMIN/GLOB SERPL: 1.7 G/DL
ALP SERPL-CCNC: 73 U/L (ref 30–99)
ALT SERPL-CCNC: 18 U/L (ref 2–50)
ANION GAP SERPL CALC-SCNC: 8 MMOL/L (ref 0–11.9)
AST SERPL-CCNC: 20 U/L (ref 12–45)
BASOPHILS # BLD AUTO: 0.5 % (ref 0–1.8)
BASOPHILS # BLD: 0.02 K/UL (ref 0–0.12)
BILIRUB SERPL-MCNC: 0.7 MG/DL (ref 0.1–1.5)
BUN SERPL-MCNC: 17 MG/DL (ref 8–22)
CALCIUM SERPL-MCNC: 9.9 MG/DL (ref 8.5–10.5)
CHLORIDE SERPL-SCNC: 105 MMOL/L (ref 96–112)
CO2 SERPL-SCNC: 29 MMOL/L (ref 20–33)
CREAT SERPL-MCNC: 0.95 MG/DL (ref 0.5–1.4)
EOSINOPHIL # BLD AUTO: 0.06 K/UL (ref 0–0.51)
EOSINOPHIL NFR BLD: 1.5 % (ref 0–6.9)
ERYTHROCYTE [DISTWIDTH] IN BLOOD BY AUTOMATED COUNT: 39.4 FL (ref 35.9–50)
GLOBULIN SER CALC-MCNC: 2.7 G/DL (ref 1.9–3.5)
GLUCOSE SERPL-MCNC: 89 MG/DL (ref 65–99)
HCT VFR BLD AUTO: 48.8 % (ref 42–52)
HGB BLD-MCNC: 15.9 G/DL (ref 14–18)
IMM GRANULOCYTES # BLD AUTO: 0.01 K/UL (ref 0–0.11)
IMM GRANULOCYTES NFR BLD AUTO: 0.2 % (ref 0–0.9)
LYMPHOCYTES # BLD AUTO: 1.67 K/UL (ref 1–4.8)
LYMPHOCYTES NFR BLD: 41.3 % (ref 22–41)
MCH RBC QN AUTO: 29.7 PG (ref 27–33)
MCHC RBC AUTO-ENTMCNC: 32.6 G/DL (ref 33.7–35.3)
MCV RBC AUTO: 91 FL (ref 81.4–97.8)
MONOCYTES # BLD AUTO: 0.38 K/UL (ref 0–0.85)
MONOCYTES NFR BLD AUTO: 9.4 % (ref 0–13.4)
NEUTROPHILS # BLD AUTO: 1.9 K/UL (ref 1.82–7.42)
NEUTROPHILS NFR BLD: 47.1 % (ref 44–72)
NRBC # BLD AUTO: 0 K/UL
NRBC BLD-RTO: 0 /100 WBC
PLATELET # BLD AUTO: 203 K/UL (ref 164–446)
PMV BLD AUTO: 9.9 FL (ref 9–12.9)
POTASSIUM SERPL-SCNC: 4 MMOL/L (ref 3.6–5.5)
PROT SERPL-MCNC: 7.3 G/DL (ref 6–8.2)
RBC # BLD AUTO: 5.36 M/UL (ref 4.7–6.1)
SODIUM SERPL-SCNC: 142 MMOL/L (ref 135–145)
WBC # BLD AUTO: 4 K/UL (ref 4.8–10.8)

## 2019-09-23 PROCEDURE — 80053 COMPREHEN METABOLIC PANEL: CPT

## 2019-09-23 PROCEDURE — 36415 COLL VENOUS BLD VENIPUNCTURE: CPT

## 2019-09-23 PROCEDURE — 85025 COMPLETE CBC W/AUTO DIFF WBC: CPT

## 2019-10-01 ENCOUNTER — NON-PROVIDER VISIT (OUTPATIENT)
Dept: MEDICAL GROUP | Facility: PHYSICIAN GROUP | Age: 54
End: 2019-10-01
Payer: COMMERCIAL

## 2019-10-01 DIAGNOSIS — Z23 NEED FOR VACCINATION: ICD-10-CM

## 2019-10-01 PROCEDURE — 90686 IIV4 VACC NO PRSV 0.5 ML IM: CPT | Performed by: FAMILY MEDICINE

## 2019-10-01 PROCEDURE — 90471 IMMUNIZATION ADMIN: CPT | Performed by: FAMILY MEDICINE

## 2019-10-01 NOTE — PROGRESS NOTES
"Raul Hinojosa is a 54 y.o. male here for a non-provider visit for:   FLU    Reason for immunization: Annual Flu Vaccine  Immunization records indicate need for vaccine: Yes, confirmed with Epic  Minimum interval has been met for this vaccine: Yes  ABN completed: Yes    Order and dose verified by: Keiry SCHULTE Dated  08/15/2019 was given to patient: Yes  All IAC Questionnaire questions were answered \"No.\"    Patient tolerated injection and no adverse effects were observed or reported: Yes    Pt scheduled for next dose in series: No    "

## 2019-11-24 ENCOUNTER — PATIENT MESSAGE (OUTPATIENT)
Dept: MEDICAL GROUP | Facility: PHYSICIAN GROUP | Age: 54
End: 2019-11-24

## 2019-11-24 DIAGNOSIS — Z12.5 SCREENING FOR PROSTATE CANCER: ICD-10-CM

## 2019-11-26 ENCOUNTER — HOSPITAL ENCOUNTER (OUTPATIENT)
Dept: LAB | Facility: MEDICAL CENTER | Age: 54
End: 2019-11-26
Attending: FAMILY MEDICINE
Payer: COMMERCIAL

## 2019-11-26 DIAGNOSIS — Z12.5 SCREENING FOR PROSTATE CANCER: ICD-10-CM

## 2019-11-26 LAB — PSA SERPL-MCNC: 0.64 NG/ML (ref 0–4)

## 2019-11-26 PROCEDURE — 36415 COLL VENOUS BLD VENIPUNCTURE: CPT

## 2019-11-26 PROCEDURE — 84153 ASSAY OF PSA TOTAL: CPT

## 2020-01-03 ENCOUNTER — OFFICE VISIT (OUTPATIENT)
Dept: MEDICAL GROUP | Facility: PHYSICIAN GROUP | Age: 55
End: 2020-01-03
Payer: COMMERCIAL

## 2020-01-03 VITALS
HEART RATE: 82 BPM | HEIGHT: 69 IN | OXYGEN SATURATION: 98 % | SYSTOLIC BLOOD PRESSURE: 128 MMHG | TEMPERATURE: 98.1 F | WEIGHT: 147.4 LBS | BODY MASS INDEX: 21.83 KG/M2 | RESPIRATION RATE: 16 BRPM | DIASTOLIC BLOOD PRESSURE: 72 MMHG

## 2020-01-03 DIAGNOSIS — Z85.841 HISTORY OF EPENDYMOMA OF BRAIN: ICD-10-CM

## 2020-01-03 DIAGNOSIS — N40.1 BENIGN PROSTATIC HYPERPLASIA WITH WEAK URINARY STREAM: ICD-10-CM

## 2020-01-03 DIAGNOSIS — Z86.73 HISTORY OF STROKE: ICD-10-CM

## 2020-01-03 DIAGNOSIS — R39.12 BENIGN PROSTATIC HYPERPLASIA WITH WEAK URINARY STREAM: ICD-10-CM

## 2020-01-03 DIAGNOSIS — G44.89 OTHER HEADACHE SYNDROME: ICD-10-CM

## 2020-01-03 PROCEDURE — 99214 OFFICE O/P EST MOD 30 MIN: CPT | Performed by: FAMILY MEDICINE

## 2020-01-03 RX ORDER — TAMSULOSIN HYDROCHLORIDE 0.4 MG/1
0.4 CAPSULE ORAL
Qty: 30 CAP | Refills: 1 | Status: SHIPPED | OUTPATIENT
Start: 2020-01-03 | End: 2020-02-20 | Stop reason: SDUPTHER

## 2020-01-03 ASSESSMENT — PATIENT HEALTH QUESTIONNAIRE - PHQ9: CLINICAL INTERPRETATION OF PHQ2 SCORE: 0

## 2020-01-03 NOTE — PROGRESS NOTES
Subjective:   Raul Hinojosa is a 54 y.o. male here today for follow up on his benign prostatic hyperplasia and his headache syndrome, would like to discuss prescription sunglasses.     Benign prostatic hyperplasia with lower urinary tract symptoms, symptom details unspecified  This is a chronic problem that is stable. Patient reports that he has been having difficulty urinating; he has a hard time beginning to urinate and once he starts he reports he has a hard time stopping. This has been a problem for nearly 15 years. He has tried numerous methods to treat this at home with little relief provided. He is inquiring if there are any other methods that could be performed at this time.    Other headache syndrome  History of ependymoma of brain  History of stroke  This is a chronic problem that is stable. Patient reports no problems at this time. He denies any headaches, vision changes, dizziness, lightheadedness, or any other acute problems. He believes it may have been seasonal allergies that were triggering sinus headaches but this has completely resolved. He continues to follow up with Neurosurgery every couple of years.    Health Maintenance  This is a new problem to me. Patient presents stating that he is having some increased difficulty reading street signs from a long distance. He would like to have a pair of prescription sunglasses written for him. He adds that normally [employer] has these types of sunglasses. Patient would need to see an optometrist for this problem.    Current medicines (including changes today)  Current Outpatient Medications   Medication Sig Dispense Refill   • tamsulosin (FLOMAX) 0.4 MG capsule Take 1 Cap by mouth ONE-HALF HOUR AFTER BREAKFAST. 30 Cap 1   • gabapentin (NEURONTIN) 300 MG Cap Take 1 Cap by mouth 2 Times a Day. 60 Cap 0   • baclofen (LIORESAL) 10 MG Tab Take 1 Tab by mouth 3 times a day. 90 Tab 0   • azithromycin (ZITHROMAX) 250 MG Tab Take 2 tablets PO on day #1, then 1 tablet  "PO daily on days #2-5. As needed for infection. (Patient not taking: Reported on 1/3/2020) 6 Tab 0     No current facility-administered medications for this visit.      He  has a past medical history of Barany's nystagmus (11/14/2011), Brain tumor (HCC) (2011), Hypertension, Other specified disorder of intestines, Snoring, and Vestibular disorder (11/14/2011).    ROS   No chest pain, no shortness of breath, no abdominal pain, no vision changes, no dizziness, no lightheadedness.     Objective:     Physical Exam:  /72 (BP Location: Right arm, Patient Position: Sitting, BP Cuff Size: Adult)   Pulse 82   Temp 36.7 °C (98.1 °F) (Temporal)   Resp 16   Ht 1.753 m (5' 9\")   Wt 66.9 kg (147 lb 6.4 oz)   SpO2 98%  Body mass index is 21.77 kg/m².   Constitutional: Alert, no distress, well-groomed.  Skin: Warm, dry, good turgor, no rashes in visible areas.  Eye: Equal, round and reactive, conjunctiva clear, lids normal.  ENMT: Lips without lesions, good dentition, moist mucous membranes.  Neck: Trachea midline, no masses, no thyromegaly.  Respiratory: Unlabored respiratory effort, no cough.  Abdomen: Soft, no gross masses.  MSK: Normal gait, moves all extremities.  Neuro: Grossly non-focal. No cranial nerve deficit. Strength and sensation intact.   Psych: Alert and oriented x3, normal affect and mood.    Assessment and Plan:     1. Benign prostatic hyperplasia with weak urinary stream  This is a chronic problem that is currently stable. He presents with persistent urinary retention followed by a polyuric sensation while urinating. I explained the physiology behind the constriction of the urethra as the prostate enlarges. I informed patient that there are a few options moving forward: we can consider surgery, placing patient on a prescription medication, or continuing the home methods. Also discussed referral to Urology if patient would like further evaluation. Informed patient that I would like to prescribe him " Flomax and we can try this for one month, instructed him to take it after breakfast every morning. We will continue to follow up with patient for this problem and monitor how well Flomax is working.  - tamsulosin (FLOMAX) 0.4 MG capsule; Take 1 Cap by mouth ONE-HALF HOUR AFTER BREAKFAST.  Dispense: 30 Cap; Refill: 1    2. Other headache syndrome  3. History of ependymoma of brain  4. History of stroke  Chronic and stable. Patient reports no problems at this time including headaches, vision changes, dizziness, and lightheadedness. We will continue to monitor closely and I informed patient that we will continue to follow up. Discussed ED precautions. Encouraged continued follow up with Neurosurgery.    Followup: Return if symptoms worsen or fail to improve.         IDante (Scribe), am scribing for, and in the presence of, Mildred Vaz MD    Electronically signed by: Dante Dash (Scribe), 1/3/2020    IMildred MD personally performed the services described in this documentation, as scribed by Dante Dash in my presence, and it is both accurate and complete.

## 2020-02-19 ENCOUNTER — PATIENT MESSAGE (OUTPATIENT)
Dept: MEDICAL GROUP | Facility: PHYSICIAN GROUP | Age: 55
End: 2020-02-19

## 2020-02-19 DIAGNOSIS — R39.12 BENIGN PROSTATIC HYPERPLASIA WITH WEAK URINARY STREAM: ICD-10-CM

## 2020-02-19 DIAGNOSIS — N40.1 BENIGN PROSTATIC HYPERPLASIA WITH WEAK URINARY STREAM: ICD-10-CM

## 2020-02-20 RX ORDER — TAMSULOSIN HYDROCHLORIDE 0.4 MG/1
0.4 CAPSULE ORAL
Qty: 30 CAP | Refills: 2 | Status: SHIPPED | OUTPATIENT
Start: 2020-02-20 | End: 2020-06-03 | Stop reason: SDUPTHER

## 2020-03-27 ENCOUNTER — HOSPITAL ENCOUNTER (OUTPATIENT)
Dept: RADIOLOGY | Facility: MEDICAL CENTER | Age: 55
End: 2020-03-27
Attending: NURSE PRACTITIONER
Payer: COMMERCIAL

## 2020-03-27 VITALS — BODY MASS INDEX: 21.52 KG/M2 | WEIGHT: 142 LBS | HEIGHT: 68 IN

## 2020-03-27 DIAGNOSIS — D43.1: ICD-10-CM

## 2020-03-27 PROCEDURE — A9270 NON-COVERED ITEM OR SERVICE: HCPCS | Performed by: RADIOLOGY

## 2020-03-27 PROCEDURE — A9576 INJ PROHANCE MULTIPACK: HCPCS | Performed by: NURSE PRACTITIONER

## 2020-03-27 PROCEDURE — 72156 MRI NECK SPINE W/O & W/DYE: CPT

## 2020-03-27 PROCEDURE — 700117 HCHG RX CONTRAST REV CODE 255: Performed by: NURSE PRACTITIONER

## 2020-03-27 PROCEDURE — 700102 HCHG RX REV CODE 250 W/ 637 OVERRIDE(OP): Performed by: RADIOLOGY

## 2020-03-27 PROCEDURE — 70553 MRI BRAIN STEM W/O & W/DYE: CPT

## 2020-03-27 RX ORDER — DIAZEPAM 5 MG/1
5-10 TABLET ORAL
Status: COMPLETED | OUTPATIENT
Start: 2020-03-27 | End: 2020-03-27

## 2020-03-27 RX ORDER — DIAZEPAM 5 MG/1
5 TABLET ORAL
Status: COMPLETED | OUTPATIENT
Start: 2020-03-27 | End: 2020-03-27

## 2020-03-27 RX ADMIN — DIAZEPAM 5 MG: 5 TABLET ORAL at 11:36

## 2020-03-27 RX ADMIN — GADOTERIDOL 15 ML: 279.3 INJECTION, SOLUTION INTRAVENOUS at 14:46

## 2020-03-27 RX ADMIN — DIAZEPAM 5 MG: 5 TABLET ORAL at 11:04

## 2020-03-27 ASSESSMENT — FIBROSIS 4 INDEX: FIB4 SCORE: 1.32

## 2020-03-27 NOTE — DISCHARGE INSTRUCTIONS
MRI ADULT DISCHARGE INSTRUCTIONS    You have been medicated today for your scan. Please follow the instructions below to ensure your safe recovery. If you have any questions or problems, feel free to call us at [phone] or [phone].     1.   Have someone stay with you to assist you as needed.    2.   Do not drive or operate any mechanical devices.    3.   Do not perform any activity that requires concentration. Make no major decisions over the next 24 hours.     4.   Be careful changing positions from laying down to sitting or standing, as you may become dizzy.     5.   Do not drink alcohol for 48 hours.    6.   There are no restrictions for eating your normal meals. Drink fluids.    7.   You may continue your usual medications for pain, tranquilizers, muscle relaxants or sedatives when awake.     8.   Tomorrow, you may continue your normal daily activities.     9.   Pressure dressing on 10 - 15 minutes. If swelling or bleeding occurs when removed, continue placing direct pressure on injection site for another 5 minutes, or until bleeding stops.     I have been informed of and understand the above discharge instructions. Diazepam (VALIUM) Oral solution  What is this medicine?  You were prescribed DIAZEPAM (dye AZ e jesika) for the procedure you had today. This medication is a benzodiazepine. It is used to treat anxiety and nervousness. It also can help treat alcohol withdrawal, relax muscles, and treat certain types of seizures.  This medicine may be used for other purposes; ask your health care provider or pharmacist if you have questions.  What side effects may I notice from receiving this medicine?  Side effects that you should report to your doctor or health care professional as soon as possible:  • allergic reactions like skin rash, itching or hives, swelling of the face, lips, or tongue  • angry, confused, depressed, other mood changes  • breathing problems  • feeling faint or lightheaded, falls  • muscle  cramps  • problems with balance, talking, walking  • restlessness  • tremors  • trouble passing urine or change in the amount of urine  • unusually weak or tired  Side effects that usually do not require medical attention (report to your doctor or health care professional if they continue or are bothersome):  • difficulty sleeping, nightmares  • dizziness, drowsiness, clumsiness, or unsteadiness, a hangover effect  • headache  • nausea, vomiting  This list may not describe all possible side effects. Call your doctor for medical advice about side effects. You may report side effects to FDA at 4-367-KJL-5581.

## 2020-06-01 ENCOUNTER — PATIENT MESSAGE (OUTPATIENT)
Dept: MEDICAL GROUP | Facility: PHYSICIAN GROUP | Age: 55
End: 2020-06-01

## 2020-06-01 DIAGNOSIS — N40.1 BENIGN PROSTATIC HYPERPLASIA WITH WEAK URINARY STREAM: ICD-10-CM

## 2020-06-01 DIAGNOSIS — R39.12 BENIGN PROSTATIC HYPERPLASIA WITH WEAK URINARY STREAM: ICD-10-CM

## 2020-06-03 ENCOUNTER — PATIENT MESSAGE (OUTPATIENT)
Dept: MEDICAL GROUP | Facility: PHYSICIAN GROUP | Age: 55
End: 2020-06-03

## 2020-06-03 DIAGNOSIS — R39.12 BENIGN PROSTATIC HYPERPLASIA WITH WEAK URINARY STREAM: ICD-10-CM

## 2020-06-03 DIAGNOSIS — N40.1 BENIGN PROSTATIC HYPERPLASIA WITH WEAK URINARY STREAM: ICD-10-CM

## 2020-06-03 RX ORDER — TAMSULOSIN HYDROCHLORIDE 0.4 MG/1
0.4 CAPSULE ORAL
Qty: 30 CAP | Refills: 2 | Status: SHIPPED | OUTPATIENT
Start: 2020-06-03 | End: 2020-09-08 | Stop reason: SDUPTHER

## 2020-06-03 RX ORDER — FINASTERIDE 5 MG/1
5 TABLET, FILM COATED ORAL DAILY
Qty: 30 TAB | Refills: 2 | Status: SHIPPED | OUTPATIENT
Start: 2020-06-03 | End: 2020-09-08 | Stop reason: SDUPTHER

## 2020-06-04 RX ORDER — TAMSULOSIN HYDROCHLORIDE 0.4 MG/1
CAPSULE ORAL
Qty: 30 CAP | Refills: 0 | OUTPATIENT
Start: 2020-06-04

## 2020-06-04 NOTE — TELEPHONE ENCOUNTER
From: Raul Hinojosa  To: Mildred Vaz M.D.  Sent: 6/3/2020 8:10 PM PDT  Subject: Prescription Question    I will be sure to do that. I just found out that I don't have refills available for Tamsulosin. Will you please send an order for more of that also?    Thank you!      ----- Message -----   From:Mildred Vaz M.D.   Sent:6/3/2020 3:34 PM PDT   To:Raul Hinojosa   Subject:RE: Prescription Question    Titus Carter,    Yes, I will send that over right now! Let me know how it goes for you.    -MW      ----- Message -----   From:Raul Hinojosa   Sent:6/3/2020 1:06 PM PDT   To:Mildred Vaz M.D.   Subject:RE: Prescription Question    Maria Fernanda Vaz,  Will you please order a prescription of Proscar for me and let me know when the order has been placed?  Thank you very much!  Raul Hinojosa      ----- Message -----   From:Mildred Vaz M.D.   Sent:6/1/2020 8:05 AM PDT   To:Raul Hinojosa   Subject:RE: Prescription Question    Titus Carter,    We would actually use the two together. The medications work in different ways. Are you okay with me adding on the Proscar?    -Mildred Vaz M.D.      ----- Message -----   From:Raul Hinojosa   Sent:6/1/2020 7:52 AM PDT   To:Mildred Vaz M.D.   Subject:Prescription Question    Good morning Dr. Vaz,    I am still taking Tamsulosin and still having issues emptying my bladder. In February you suggested Proscar. If I try it but experience negative side effects, would it be an easy transition back to Tamsulosin?    Kind regards,    Raul Hinojosa

## 2020-09-03 ENCOUNTER — PATIENT MESSAGE (OUTPATIENT)
Dept: MEDICAL GROUP | Facility: PHYSICIAN GROUP | Age: 55
End: 2020-09-03

## 2020-09-03 DIAGNOSIS — R39.12 BENIGN PROSTATIC HYPERPLASIA WITH WEAK URINARY STREAM: ICD-10-CM

## 2020-09-03 DIAGNOSIS — N40.1 BENIGN PROSTATIC HYPERPLASIA WITH WEAK URINARY STREAM: ICD-10-CM

## 2020-09-08 ENCOUNTER — PATIENT MESSAGE (OUTPATIENT)
Dept: MEDICAL GROUP | Facility: PHYSICIAN GROUP | Age: 55
End: 2020-09-08

## 2020-09-08 DIAGNOSIS — N40.1 BENIGN PROSTATIC HYPERPLASIA WITH WEAK URINARY STREAM: ICD-10-CM

## 2020-09-08 DIAGNOSIS — R39.12 BENIGN PROSTATIC HYPERPLASIA WITH WEAK URINARY STREAM: ICD-10-CM

## 2020-09-08 RX ORDER — TAMSULOSIN HYDROCHLORIDE 0.4 MG/1
0.4 CAPSULE ORAL
Qty: 90 CAP | Refills: 1 | Status: SHIPPED | OUTPATIENT
Start: 2020-09-08 | End: 2021-02-27 | Stop reason: SDUPTHER

## 2020-09-08 RX ORDER — FINASTERIDE 5 MG/1
5 TABLET, FILM COATED ORAL DAILY
Qty: 90 TAB | Refills: 1 | Status: SHIPPED | OUTPATIENT
Start: 2020-09-08 | End: 2021-02-27 | Stop reason: SDUPTHER

## 2020-09-14 ENCOUNTER — IMMUNIZATION (OUTPATIENT)
Dept: SOCIAL WORK | Facility: CLINIC | Age: 55
End: 2020-09-14
Payer: COMMERCIAL

## 2020-09-14 DIAGNOSIS — Z23 NEED FOR VACCINATION: ICD-10-CM

## 2020-09-14 PROCEDURE — 90471 IMMUNIZATION ADMIN: CPT | Performed by: REGISTERED NURSE

## 2020-09-14 PROCEDURE — 90686 IIV4 VACC NO PRSV 0.5 ML IM: CPT | Performed by: REGISTERED NURSE

## 2020-10-18 ENCOUNTER — PATIENT MESSAGE (OUTPATIENT)
Dept: MEDICAL GROUP | Facility: PHYSICIAN GROUP | Age: 55
End: 2020-10-18

## 2020-10-18 DIAGNOSIS — M25.511 CHRONIC RIGHT SHOULDER PAIN: ICD-10-CM

## 2020-10-18 DIAGNOSIS — G89.29 CHRONIC RIGHT SHOULDER PAIN: ICD-10-CM

## 2020-11-11 ENCOUNTER — PATIENT MESSAGE (OUTPATIENT)
Dept: MEDICAL GROUP | Facility: PHYSICIAN GROUP | Age: 55
End: 2020-11-11

## 2020-11-11 DIAGNOSIS — R26.89 POOR BALANCE: ICD-10-CM

## 2020-11-11 DIAGNOSIS — Z85.841 HISTORY OF EPENDYMOMA OF BRAIN: ICD-10-CM

## 2020-11-11 DIAGNOSIS — G44.89 OTHER HEADACHE SYNDROME: ICD-10-CM

## 2020-11-11 DIAGNOSIS — Z86.73 HISTORY OF STROKE: ICD-10-CM

## 2020-11-24 ENCOUNTER — HOSPITAL ENCOUNTER (OUTPATIENT)
Dept: LAB | Facility: MEDICAL CENTER | Age: 55
End: 2020-11-24
Attending: PHYSICIAN ASSISTANT
Payer: COMMERCIAL

## 2020-11-24 LAB — PSA SERPL-MCNC: 0.43 NG/ML (ref 0–4)

## 2020-11-24 PROCEDURE — 84153 ASSAY OF PSA TOTAL: CPT

## 2020-11-24 PROCEDURE — 36415 COLL VENOUS BLD VENIPUNCTURE: CPT

## 2021-01-04 ENCOUNTER — HOSPITAL ENCOUNTER (OUTPATIENT)
Dept: LAB | Facility: MEDICAL CENTER | Age: 56
End: 2021-01-04
Attending: FAMILY MEDICINE
Payer: COMMERCIAL

## 2021-01-04 LAB
25(OH)D3 SERPL-MCNC: 40 NG/ML (ref 30–100)
ALBUMIN SERPL BCP-MCNC: 4.7 G/DL (ref 3.2–4.9)
ALBUMIN/GLOB SERPL: 1.4 G/DL
ALP SERPL-CCNC: 96 U/L (ref 30–99)
ALT SERPL-CCNC: 25 U/L (ref 2–50)
ANION GAP SERPL CALC-SCNC: 12 MMOL/L (ref 7–16)
AST SERPL-CCNC: 25 U/L (ref 12–45)
BASOPHILS # BLD AUTO: 0.8 % (ref 0–1.8)
BASOPHILS # BLD: 0.03 K/UL (ref 0–0.12)
BILIRUB SERPL-MCNC: 0.3 MG/DL (ref 0.1–1.5)
BUN SERPL-MCNC: 13 MG/DL (ref 8–22)
CALCIUM SERPL-MCNC: 10.2 MG/DL (ref 8.5–10.5)
CHLORIDE SERPL-SCNC: 102 MMOL/L (ref 96–112)
CHOLEST SERPL-MCNC: 186 MG/DL (ref 100–199)
CO2 SERPL-SCNC: 28 MMOL/L (ref 20–33)
CREAT SERPL-MCNC: 0.78 MG/DL (ref 0.5–1.4)
CRP SERPL HS-MCNC: 1.4 MG/L (ref 0–7.5)
EOSINOPHIL # BLD AUTO: 0.04 K/UL (ref 0–0.51)
EOSINOPHIL NFR BLD: 1.1 % (ref 0–6.9)
ERYTHROCYTE [DISTWIDTH] IN BLOOD BY AUTOMATED COUNT: 41.1 FL (ref 35.9–50)
EST. AVERAGE GLUCOSE BLD GHB EST-MCNC: 111 MG/DL
ESTRADIOL SERPL-MCNC: 31 PG/ML
GLOBULIN SER CALC-MCNC: 3.3 G/DL (ref 1.9–3.5)
GLUCOSE SERPL-MCNC: 93 MG/DL (ref 65–99)
HBA1C MFR BLD: 5.5 % (ref 0–5.6)
HCT VFR BLD AUTO: 50.1 % (ref 42–52)
HCYS SERPL-SCNC: 8.19 UMOL/L
HDLC SERPL-MCNC: 62 MG/DL
HGB BLD-MCNC: 16.1 G/DL (ref 14–18)
IMM GRANULOCYTES # BLD AUTO: 0.01 K/UL (ref 0–0.11)
IMM GRANULOCYTES NFR BLD AUTO: 0.3 % (ref 0–0.9)
LDLC SERPL CALC-MCNC: 110 MG/DL
LYMPHOCYTES # BLD AUTO: 1.51 K/UL (ref 1–4.8)
LYMPHOCYTES NFR BLD: 41.4 % (ref 22–41)
MCH RBC QN AUTO: 30 PG (ref 27–33)
MCHC RBC AUTO-ENTMCNC: 32.1 G/DL (ref 33.7–35.3)
MCV RBC AUTO: 93.3 FL (ref 81.4–97.8)
MONOCYTES # BLD AUTO: 0.31 K/UL (ref 0–0.85)
MONOCYTES NFR BLD AUTO: 8.5 % (ref 0–13.4)
NEUTROPHILS # BLD AUTO: 1.75 K/UL (ref 1.82–7.42)
NEUTROPHILS NFR BLD: 47.9 % (ref 44–72)
NRBC # BLD AUTO: 0 K/UL
NRBC BLD-RTO: 0 /100 WBC
PLATELET # BLD AUTO: 212 K/UL (ref 164–446)
PMV BLD AUTO: 9.7 FL (ref 9–12.9)
POTASSIUM SERPL-SCNC: 4.4 MMOL/L (ref 3.6–5.5)
PROLACTIN SERPL-MCNC: 16.4 NG/ML (ref 2.1–17.7)
PROT SERPL-MCNC: 8 G/DL (ref 6–8.2)
PSA SERPL-MCNC: 0.35 NG/ML (ref 0–4)
RBC # BLD AUTO: 5.37 M/UL (ref 4.7–6.1)
SODIUM SERPL-SCNC: 142 MMOL/L (ref 135–145)
T3FREE SERPL-MCNC: 2.97 PG/ML (ref 2–4.4)
THYROPEROXIDASE AB SERPL-ACNC: 12 IU/ML (ref 0–9)
TRIGL SERPL-MCNC: 70 MG/DL (ref 0–149)
TSH SERPL DL<=0.005 MIU/L-ACNC: 1.7 UIU/ML (ref 0.38–5.33)
WBC # BLD AUTO: 3.7 K/UL (ref 4.8–10.8)

## 2021-01-04 PROCEDURE — 84270 ASSAY OF SEX HORMONE GLOBUL: CPT

## 2021-01-04 PROCEDURE — 84140 ASSAY OF PREGNENOLONE: CPT

## 2021-01-04 PROCEDURE — 86800 THYROGLOBULIN ANTIBODY: CPT

## 2021-01-04 PROCEDURE — 82542 COL CHROMOTOGRAPHY QUAL/QUAN: CPT

## 2021-01-04 PROCEDURE — 36415 COLL VENOUS BLD VENIPUNCTURE: CPT

## 2021-01-04 PROCEDURE — 84305 ASSAY OF SOMATOMEDIN: CPT

## 2021-01-04 PROCEDURE — 82670 ASSAY OF TOTAL ESTRADIOL: CPT

## 2021-01-04 PROCEDURE — 84481 FREE ASSAY (FT-3): CPT

## 2021-01-04 PROCEDURE — 82306 VITAMIN D 25 HYDROXY: CPT

## 2021-01-04 PROCEDURE — 84153 ASSAY OF PSA TOTAL: CPT

## 2021-01-04 PROCEDURE — 80053 COMPREHEN METABOLIC PANEL: CPT

## 2021-01-04 PROCEDURE — 84403 ASSAY OF TOTAL TESTOSTERONE: CPT

## 2021-01-04 PROCEDURE — 84402 ASSAY OF FREE TESTOSTERONE: CPT

## 2021-01-04 PROCEDURE — 86376 MICROSOMAL ANTIBODY EACH: CPT

## 2021-01-04 PROCEDURE — 83036 HEMOGLOBIN GLYCOSYLATED A1C: CPT

## 2021-01-04 PROCEDURE — 85025 COMPLETE CBC W/AUTO DIFF WBC: CPT

## 2021-01-04 PROCEDURE — 84482 T3 REVERSE: CPT

## 2021-01-04 PROCEDURE — 80061 LIPID PANEL: CPT

## 2021-01-04 PROCEDURE — 83090 ASSAY OF HOMOCYSTEINE: CPT

## 2021-01-04 PROCEDURE — 86141 C-REACTIVE PROTEIN HS: CPT

## 2021-01-04 PROCEDURE — 83525 ASSAY OF INSULIN: CPT

## 2021-01-04 PROCEDURE — 84443 ASSAY THYROID STIM HORMONE: CPT

## 2021-01-04 PROCEDURE — 84146 ASSAY OF PROLACTIN: CPT

## 2021-01-05 LAB
SHBG SERPL-SCNC: 91 NMOL/L (ref 11–80)
TESTOST FREE MFR SERPL: 1.1 % (ref 1.6–2.9)
TESTOST FREE SERPL-MCNC: 137 PG/ML (ref 47–244)
TESTOST SERPL-MCNC: 1225 NG/DL (ref 300–890)
THYROGLOB AB SERPL-ACNC: <0.9 IU/ML (ref 0–4)

## 2021-01-06 LAB
IGF-I SERPL-MCNC: 153 NG/ML (ref 58–204)
IGF-I Z-SCORE SERPL: 0.8

## 2021-01-07 LAB — PREG SERPL-MCNC: 104 NG/DL (ref 23–173)

## 2021-01-11 LAB — T3REVERSE SERPL-MCNC: 14.2 NG/DL (ref 9–27)

## 2021-01-12 LAB — ANDROSTANOLONE SERPL-MCNC: 216.6 PG/ML (ref 106–719)

## 2021-01-16 LAB — INSULIN P FAST SERPL-ACNC: 8 UIU/ML (ref 3–19)

## 2021-01-27 ENCOUNTER — TELEPHONE (OUTPATIENT)
Dept: MEDICAL GROUP | Facility: PHYSICIAN GROUP | Age: 56
End: 2021-01-27

## 2021-01-27 DIAGNOSIS — Z86.73 HISTORY OF STROKE: ICD-10-CM

## 2021-01-27 DIAGNOSIS — Z85.841 HISTORY OF EPENDYMOMA OF BRAIN: ICD-10-CM

## 2021-01-27 DIAGNOSIS — R26.89 POOR BALANCE: ICD-10-CM

## 2021-01-27 NOTE — TELEPHONE ENCOUNTER
Brenda with PT called and requested a provider give her a call back to discuss pts case, she did not give me any details.    Brenda can be reached at [phone]

## 2021-01-27 NOTE — TELEPHONE ENCOUNTER
I called Brenda back to discuss this patient. Upon chart review, it appears Dr. Vaz referred him for poor balance. PT notes severe autonomic imbalance. If he works out his HR significantly elevates and BP fluctuates. He will have fatigue for days following exercise. PT reports he has difficulty swallowing and aspirates when he is fatigued. He will also aspirate when drinking water. His speech slows with fatigue.     I've placed an urgent speech therapy evaluation.

## 2021-01-29 ENCOUNTER — SPEECH THERAPY (OUTPATIENT)
Dept: SPEECH THERAPY | Facility: REHABILITATION | Age: 56
End: 2021-01-29
Attending: FAMILY MEDICINE
Payer: COMMERCIAL

## 2021-01-29 DIAGNOSIS — Z85.841 HISTORY OF EPENDYMOMA OF BRAIN: ICD-10-CM

## 2021-01-29 DIAGNOSIS — Z86.73 HISTORY OF STROKE: ICD-10-CM

## 2021-01-29 DIAGNOSIS — R26.89 POOR BALANCE: ICD-10-CM

## 2021-01-29 DIAGNOSIS — R13.12 OROPHARYNGEAL DYSPHAGIA: ICD-10-CM

## 2021-01-29 PROCEDURE — 92610 EVALUATE SWALLOWING FUNCTION: CPT

## 2021-01-29 ASSESSMENT — ENCOUNTER SYMPTOMS
PAIN SCALE AT HIGHEST: 5
PAIN SCALE: 2

## 2021-01-29 NOTE — OP THERAPY EVALUATION
"  Outpatient Speech Therapy  INITIAL EVALUATION    [facility] Speech Therapy [place] [address]  [address]  [place] [address]  Phone:  [phone]  Fax:  [phone]    Date of Evaluation: 2021    Patient: Raul Hinojosa  YOB: 1965  MRN: [id number]     Referring Provider: Mariann Garcia M.D.  [address]  [place] [address]   Referring Diagnosis History of ependymoma of brain [Z85.841];History of stroke [Z86.73];Poor balance [R26.89]     Time Calculation    Start time: 930  Stop time: 101 Time Calculation (min): 42 minutes           Chief Complaint: Choking, Cough, and Difficulty Swallowing    Visit Diagnoses     ICD-10-CM   1. History of ependymoma of brain  Z85.841   2. History of stroke  Z86.73   3. Poor balance  R26.89   4. Oropharyngeal dysphagia  R13.12     Subjective:   Reason for Therapy:     Reason For Evaluation:  Dysphagia    Onset Description:  Brenda petersen [facility] has been seeing patient and was concerned with changes in swallowing and recommended further assessment of swallowing.  Difficulty with drinking fluids and coordination.  \"Will lose control of tongue\" and this impacts speech as well.   Social Support:     Patient Mental Status:  Alert and Responsive  Progress Factors:     Progression:  Getting worse    Aggravating Factors:  Physical Activity and Prolonged Speaking  Pain:     Current pain ratin    At worst pain ratin    Location:  More discomfot than pain, sore throat, swollen  Additional Subjective Comments:      No deficits with solids.  Pills are difficulty due to intake with water.  Liquids he is not noting any difference with hot, cold, thick, or carbonated.  Straw use noted possibly less coughing episodes.  Reported that he assumed due to his ependymoma and CVAs resulting from removal of tumor that his swallowing changes were \"typical.\"  [place] on left side due to decreased taste on right.\"I will take a deep " "breath and then swallow.  Need to focus.\" Have had minimal instances where biting middle of tongue especially when over thinking sequencing swallow. Noted increased difficulty with swallowing in higher elevation.  Any increase with \"over working\" with physical activity will result with increased right sided pain and challenges with PO intake. Also having lengthy conversations or tasks that increase adrenaline will increase complications especially on entire right side of face, throat, and body.  Noted that gets \"lock jaw\" when pain increases and this will last about 2 minutes and then go away.    Has not seen an ENT. Has residual cognitive deficits and wasn't able to finish testing from Narinder Kruse MD (neuropsychologist) to determine what specific deficits he has.  Was approved for disability.       Past Medical History:   Diagnosis Date   • Barany's nystagmus 11/14/2011   • Brain tumor (HCC) 2011    in mother (?empendymoma), also glioblastoma in maternal aunt   • Hypertension    • Other specified disorder of intestines    • Snoring    • Vestibular disorder 11/14/2011     Past Surgical History:   Procedure Laterality Date   • GASTROSCOPY WITH BIOPSY  2/22/2011    Performed by BRITTANY VILLASENOR at SURGERY [facility] ORS   • CRANIOTOMY  2/7/2011    Performed by MADISON GÓMEZ at SURGERY [facility] ORS   • CERVICAL LAMINECTOMY POSTERIOR  2/7/2011    Performed by MADISON GÓMEZ at SURGERY [facility] ORS   • OTHER ORTHOPEDIC SURGERY  1996    shoulder surgery Right   • APPENDECTOMY  1983     Objective:   Treatments/Interventions Performed:  Dysphagia treatment, Home program, Patient/Caregiver education and Compensatory strategy training  Treatment Intervention tool(s) used:  Swallowing Ability and Function Evaluation (SAFE), Taina swallow protocol, EAT 10(EAT-10) is a patient administered, self-assessment of possible difficulties that may be encountered as a result of difficulty in swallow.  Scores greater than 3 " "can be indicative of changes in swallow consistent with dysphagia.          Objective Details:  Eat 10 score: 8/40 with highest score rated at a 3 related to swallowing liquids takes more effort.   SAFE scores were the following: Physical Examination MILD deficits, Oral and Pharyngeal Phase of swallowing WNL.  [place] Swallow Protocol- passed  Observed with intake of thin by cup and straw, puree, mixed, and solid textures.    Speech Therapy Assessment:     Speech Mechanism Assessment:     Patient voice description: Clear    Patient's oral movements are voluntary and coordination: Supervision Required    Patient speaks fluently: WFL    Patient exhibits articulatory precision: WFL    Patient exhibits conversational intelligibility: WFL    Nasality is within functional limits    Patient uses adequate breath support: Yes    Patient breath rate: Normal  Speech mechanism comments: Noted that decreased intelligibility in evening when fatigued and increased difficulty coordinating oral movements.  SMR mild irregular rate, AMR WFL.  Sustained \"ah\" 19 seconds and then became \"lightleaded.\"    Oral Motor Status:     Labial strength and control for patient: Good    Lingual strength and control for patient: Fair (mild right deviation and slow AP movements)    Patient saliva management: GoodPatient oral sensation and awareness: Good    Patient awareness of swallow problem: Good    Oral motor status comments: Educated on floating medications to avoid choking on liquid. Increased concentration to complete lingual elevation as well as retraction with slow movements.    Oral Phase Assessment:     Types of food within functional limits: Thin Liquid    Patient stasis and residue: Puree, Regular and Mechanical Soft    Liquid wash to clear residue: Puree and Regular    Oral phase comments: Patient aware of oral residue. Took needed swallows to clear as well as used liquid wash as needed.   Provided reasoning on how being uncoordinated in " oral phase can contribute to increased aspiration/penetration events.    Pharyngeal Phase Assessment:     Delayed Swallow    Food types within functional limits: Puree, Mechanical Soft, Regular and Thin Liquid    Pharyngeal phase comments: Hyolaryngeal elevation adequate. All prep, transit, and initiation times WFL. Patient demonstrated medium to large intake and adequate rate. Took multiple swallows to clear solids out of oral cavity.  With liquids when used cup secondary swallows initiated independently.  With straw use, no secondary swallows required.  Noted mild clearing 2-5 seconds after swallow intermittently.  He attributed to nasal drainage and clearing throat always with talking. No coughing with all intake.  Educated on smaller intake of solids as needed.  Chin tuck taught and demonstrated with thin and mixed viscosities.    Aspiration/penetration educated on.  Asked to assess clearing throughout day with and without PO intake and observe if more instances with PO.  No clearing noted prior to PO  Intake.    Speech Therapy Plan :   Prognosis & Recommendations  Impression Summary:  Raul Hinojosa is a 56 year old male who was assessed for concerns of aspiration with swallowing liquids.  He presented with mild oral deficits due to coordination and minimal pharyngeal weakness.  He did have constant delayed clearing after PO intake, but he attributed to allergies.  Patient is to observe over the next week if it constant or only with PO intake.  He was taught to complete chin tuck and encouraged to have smaller intake to assist with coordination and control of bolus.    May benefit from further assessment with MBSS depending on tolerance with strategies.  Prognosis:  Excellent  Prognosis Details: Patient would likely benefit from structured speech therapy addressing dysphagia to reduce aspiration/penetration risks.  Compensatory swallow strategies:  Upright position 90 degrees during meal and 45 minutes after  meal, Chin tuck during the swallow, Reduce bolus size and No talking while eating  Diet Recommendation:  Normal Consistency  Liquid Recommendation:  Thin  Medication Administration:  Floated  Goals  Short Term Goals:   1. Patient will improve safety in swallow implementing compensatory swallow strategies independently during therapist directed trials with 90% accuracy.  2. Patient will improve safety in swallow completing structured exercise addressing oral, pharyngeal phases of swallow completing to 90% accuracy, independent.    3. Patient will participate in MBSS to rule out aspiration and determine least restrictive, safest diet textures for PO intake if need is determined.    Short Term Goal Duration (Weeks):  4-6 weeks  Long Term Goals:  Patient will improve safety in swallow independently implementing compensatory swallow strategies during intake of regular solids, thin liquid as evidenced by no overt signs or symptoms of aspiration for primary nutrition/ hydration needs.  Long Term Goal Duration (Weeks):  1-2 months  Patient Stated Goal:  I want to have help with muscle control of swallowing and speech   Potential barriers to Goal Achievement:  None  Therapy Recommendations  Recommendation:  Dysphagia Treatment and Modified Barium Swallow Study,  Planned Therapy Interventions:  Self-care home management, Swallow Dysfunction treatment, Respiratory coordination/support training, Dysphagia treatment, Home Program and Patient/Caregiver Education,   Plan Details:  Review strategies, lingual exercises  Frequency:  1x week (92526 x 8)  Duration (in weeks):  8      Functional Assessment Used   EAT-10, SAFE    Referring provider co-signature:  I have reviewed this plan of care and my co-signature certifies the need for services.    Certification Period: 01/29/2021 to  03/26/21    Physician Signature: ________________________________ Date: ______________

## 2021-02-01 ENCOUNTER — APPOINTMENT (OUTPATIENT)
Dept: SPEECH THERAPY | Facility: REHABILITATION | Age: 56
End: 2021-02-01
Payer: COMMERCIAL

## 2021-02-05 ENCOUNTER — SPEECH THERAPY (OUTPATIENT)
Dept: SPEECH THERAPY | Facility: REHABILITATION | Age: 56
End: 2021-02-05
Attending: FAMILY MEDICINE
Payer: COMMERCIAL

## 2021-02-05 DIAGNOSIS — R13.12 OROPHARYNGEAL DYSPHAGIA: ICD-10-CM

## 2021-02-05 PROCEDURE — 92526 ORAL FUNCTION THERAPY: CPT

## 2021-02-05 NOTE — OP THERAPY DAILY TREATMENT
"  Outpatient Speech Therapy  DAILY TREATMENT     [facility] Speech [address].  [address]  [place] [address]  Phone:  [phone]  Fax:  [phone]    Date: 02/05/2021    Patient: Raul Hinojosa  YOB: 1965  MRN: [id number]     Time Calculation    Start time: 0938  Stop time: 1022 Time Calculation (min): 44 minutes         Chief Complaint: Cough (intermittently)    Visit #: 2    Subjective:   Reason for Therapy:     Reason For Evaluation:  Dysphagia  Social Support:     Patient Mental Status:  Alert and Responsive  Additional Subjective Comments:      Patient with noticed increased \"rough\" voice at end of day.  Nothing to clear, but gets changed through the day.  Aspirated 1 time since last saw, coughed after intake of liquids.  Took a couple of minutes to clear and no other difficulties. Biting tongue with larger bites.  Denies no pharyngeal residue with all intake with solids and pills.  Doing HEP as often as can.  Had a lot going on and \"stressed.\"  Completing  tongue clicks, shaker, and lingual head resistance exercises with PT.        Objective:   Treatments/Interventions Performed:  Home program, Dysphagia treatment, Patient/Caregiver education and Compensatory strategy training  Objective Details:  Lingual push x 5 for 5 seconds, lingual protrusion with deviation to right and then pull back x 5 with use of mirror able to center. Vannessa x 5, but effortful. Effortful swallow and Mendelson taught x 5.  \"Concentrating more\" with chin tuck with liquids and improved swallowing. Smaller bites and drinks with less instances of coughing.  Taught initiation exercises of 1-2-3 swallow, sour/cold bolus, and suck swallow.  Improved timing with suck swallow and 1-2-3 swallow.  Still with pausing and slower with sour/cold bolus.         Speech Therapy Assessment:     Oral Motor Status:     Lingual strength and control for patient: Good    Oral motor status comments: Improved " coordination and strength with lingual function.      Speech Therapy Plan :   Prognosis & Recommendations  Impression Summary:  Improved function and coordination of lingual function.  Reporting intermittent coughing/clearing with PO intake.  Demonstrated timing to assist with control.    Prognosis Details:  Progressing towards goals  Compensatory swallow strategies:  Upright position 90 degrees during meal and 45 minutes after meal, Chin tuck during the swallow, Reduce bolus size and No talking while eating  Diet Recommendation:  Normal Consistency  Liquid Recommendation:  Thin  Medication Administration:  Floated  Goals  Short Term Goals:   1. Patient will improve safety in swallow implementing compensatory swallow strategies independently during therapist directed trials with 90% accuracy.  2. Patient will improve safety in swallow completing structured exercise addressing oral, pharyngeal phases of swallow completing to 90% accuracy, independent.    3. Patient will participate in MBSS to rule out aspiration and determine least restrictive, safest diet textures for PO intake if need is determined.    Potential barriers to Goal Achievement:  None  Therapy Recommendations  Recommendation:  Dysphagia Treatment,  Planned Therapy Interventions:  Self-care home management, Swallow Dysfunction treatment, Respiratory coordination/support training, Dysphagia treatment, Home Program and Patient/Caregiver Education,   Plan Details:  PO trials, Mendelson, Masako, Effortful, and timing exercises

## 2021-02-09 ENCOUNTER — APPOINTMENT (OUTPATIENT)
Dept: SPEECH THERAPY | Facility: REHABILITATION | Age: 56
End: 2021-02-09
Payer: COMMERCIAL

## 2021-02-11 ENCOUNTER — APPOINTMENT (OUTPATIENT)
Dept: SPEECH THERAPY | Facility: REHABILITATION | Age: 56
End: 2021-02-11
Attending: FAMILY MEDICINE
Payer: COMMERCIAL

## 2021-02-16 ENCOUNTER — APPOINTMENT (OUTPATIENT)
Dept: SPEECH THERAPY | Facility: REHABILITATION | Age: 56
End: 2021-02-16
Payer: COMMERCIAL

## 2021-02-18 ENCOUNTER — SPEECH THERAPY (OUTPATIENT)
Dept: SPEECH THERAPY | Facility: REHABILITATION | Age: 56
End: 2021-02-18
Attending: FAMILY MEDICINE
Payer: COMMERCIAL

## 2021-02-18 DIAGNOSIS — R13.12 OROPHARYNGEAL DYSPHAGIA: ICD-10-CM

## 2021-02-18 PROCEDURE — 92526 ORAL FUNCTION THERAPY: CPT

## 2021-02-18 NOTE — OP THERAPY DAILY TREATMENT
"  Outpatient Speech Therapy  DAILY TREATMENT     [facility] Speech Therapy [address].  [address]  [place] [address]  Phone:  [phone]  Fax:  [phone]    Date: 02/18/2021    Patient: Raul Hinojosa  YOB: 1965  MRN: [id number]     Time Calculation    Start time: 0932  Stop time: 1007 Time Calculation (min): 35 minutes         Chief Complaint: Other (biting tongue)    Visit #: 3    Subjective:   Reason for Therapy:     Reason For Evaluation:  Dysphagia  Social Support:     Patient Mental Status:  Alert, Responsive and Lethargic  Additional Subjective Comments:      No aspirating since last visit.  Has increased HEP and noted that increased with biting tongue more especially related to fatigue.  \"Pushing too hard and need to back off.\"  PT worked on neck yesterday and feels that \"it helped.\"  Completing HEP every other day x 5 repetitions one time a day.  \"Feels lame,\" but cannot tolerate more.      Objective:   Treatments/Interventions Performed:  Home program, Dysphagia treatment, Compensatory strategy training and Patient/Caregiver education  Objective Details:  PO trials completed with thin by cup and solid.  Prep, transit, and initiation times WNL.  No overt s/sx of aspiration/penetration.  Reviewed aspiration signs with patient.    Vannessa x 5 no assist and with improved timing, effortful swallow x 5 no assist, Mendelson x 5 no assist.  Initiation exercises of 1-2-3 swallow, sour/cold bolus, and suck swallow reviewed.  Improved timing with PO intake and was directed he no longer needed to complete.           Speech Therapy Assessment:     Oral Motor Status:     Labial strength and control for patient: Good    Lingual strength and control for patient: Good    Patient saliva management: GoodPatient oral sensation and awareness: Good    Patient awareness of swallow problem: Good    Oral Phase Assessment:     Types of food within functional limits: Regular and Thin Liquid    " Oral phase comments: Reported that biting tongue with items that require a larger bite like sandwiches and hot dogs.  Educated to slow down and ensure the lateral side of tongue has cleared ridge of teeth to ensure clearance.  SLP educated on cutting up items into smaller wedges to assist with smaller bites.  Raul came up with possibly having only one side with bread to lessen the amount of content.    Pharyngeal Phase Assessment:     Delayed Swallow    Food types within functional limits: Regular and Thin Liquid      Speech Therapy Plan :   Prognosis & Recommendations  Impression Summary: Raul has been attending therapy to address changes with swallow function.  He presented today with no overt s/sx of aspiration with all intake.  Initiation times were WFL.  He independently verbalized strategies as well as completed all HEP with no assist.  He is ready for discharge.  Prognosis Details:  All goals met.  Compensatory swallow strategies:  Upright position 90 degrees during meal and 45 minutes after meal, Reduce bolus size and No talking while eating  Diet Recommendation:  Normal Consistency  Liquid Recommendation:  Thin  Medication Administration:  Whole  Goals  Short Term Goals:   1. Patient will improve safety in swallow implementing compensatory swallow strategies independently during therapist directed trials with 90% accuracy.  2. Patient will improve safety in swallow completing structured exercise addressing oral, pharyngeal phases of swallow completing to 90% accuracy, independent.    3. Patient will participate in MBSS to rule out aspiration and determine least restrictive, safest diet textures for PO intake if need is determined.    Therapy Recommendations  Recommendation:  Other, Mental Health for grieving process with physical changes he has gone through.   Plan Details:  Patient to be discharged.  Directed to return to outpatient swallow if there are changes with function from today's session and/or if  there are any other concerns that arise with PO intake.  Patient in agreement.

## 2021-02-18 NOTE — OP THERAPY DISCHARGE SUMMARY
"  Outpatient Speech Therapy  DISCHARGE SUMMARY NOTE      [facility] Therapy [address].  [address]  [place] [address]  Phone:  [phone]  Fax:  [phone]    Date of Visit: 02/18/2021    Patient: Raul Hinojosa  YOB: 1965  MRN: [id number]     Referring Provider: [address]  [place] [address]   Referring Diagnosis: History of ependymoma of brain [Z85.841];History of stroke [Z86.73];Poor balance [R26.89]     Visit #: 3    Time Calculation    Start time: 0932  Stop time: 1007 Time Calculation (min): 35 minutes           Chief Complaint: Other (biting tongue)    Visit Diagnoses     ICD-10-CM   1. Oropharyngeal dysphagia  R13.12       Subjective:   Reason for Therapy:     Reason For Evaluation:  Dysphagia  Social Support:     Patient Mental Status:  Alert, Responsive and Lethargic  Progress Factors:     Progression:  Getting Better  Additional Subjective Comments:      No aspirating since last visit.  Has increased HEP and noted that increased with biting tongue more especially related to fatigue.  \"Pushing too hard and need to back off.\"  PT worked on neck yesterday and feels that \"it helped.\"  Completing HEP every other day x 5 repetitions one time a day.  \"Feels lame,\" but cannot tolerate more.      Objective:   Treatments/Interventions Performed:  Home program, Patient/Caregiver education, Dysphagia treatment and Compensatory strategy training  Objective Details:  PO trials completed with thin by cup and solid.  Prep, transit, and initiation times WNL.  No overt s/sx of aspiration/penetration.  Reviewed aspiration signs with patient.    Vannessa x 5 no assist and with improved timing, effortful swallow x 5 no assist, Mendelson x 5 no assist.  Initiation exercises of 1-2-3 swallow, sour/cold bolus, and suck swallow reviewed.  Improved timing with PO intake and was directed he no longer needed to complete.       Speech Therapy Assessment:     " Oral Motor Status:     Labial strength and control for patient: Good    Lingual strength and control for patient: Good    Patient saliva management: GoodPatient oral sensation and awareness: Good    Patient awareness of swallow problem: Good    Oral Phase Assessment:     Types of food within functional limits: Thin Liquid and Regular    Oral phase comments: Reported that biting tongue with items that require a larger bite like sandwiches and hot dogs.  Educated to slow down and ensure the lateral side of tongue has cleared ridge of teeth to ensure clearance.  SLP educated on cutting up items into smaller wedges to assist with smaller bites.  Raul came up with possibly having only one side with bread to lessen the amount of content.    Pharyngeal Phase Assessment:     Delayed Swallow    Food types within functional limits: Regular and Thin Liquid      Speech Therapy Plan :   Prognosis & Recommendations  Impression Summary:  Raul has been attending therapy to address changes with swallow function.  He presented today with no overt s/sx of aspiration with all intake.  Initiation times were WFL.  He independently verbalized strategies as well as completed all HEP with no assist.  He is ready for discharge.  Prognosis Details:  All goals met  Compensatory swallow strategies: Upright position 90 degrees during meal and 45 minutes after meal, Reduce bolus size and No talking while eating  Diet Recommendation:  Normal Consistency  Liquid Recommendation:  Thin  Medication Administration:  Whole  Goals  Short Term Goals:   1. Patient will improve safety in swallow implementing compensatory swallow strategies independently during therapist directed trials with 90% accuracy. MET  2. Patient will improve safety in swallow completing structured exercise addressing oral, pharyngeal phases of swallow completing to 90% accuracy, independent.  MET  3. Patient will participate in MBSS to rule out aspiration and determine least  restrictive, safest diet textures for PO intake if need is determined. DISCONTINUED    Long Term Goals:  Patient will improve safety in swallow independently implementing compensatory swallow strategies during intake of regular solids, thin liquid as evidenced by no overt signs or symptoms of aspiration for primary nutrition/ hydration needs. MET  Therapy Recommendations  Recommendation:  Other, Mental Health for grieving process with physical changes he has gone through.   Plan Details:  Patient to be discharged.  Directed to return to outpatient swallow if there are changes with function from today's session and/or if there are any other concerns that arise with PO intake.  Patient in agreement.

## 2021-02-23 ENCOUNTER — APPOINTMENT (OUTPATIENT)
Dept: SPEECH THERAPY | Facility: REHABILITATION | Age: 56
End: 2021-02-23
Payer: COMMERCIAL

## 2021-02-27 DIAGNOSIS — R39.12 BENIGN PROSTATIC HYPERPLASIA WITH WEAK URINARY STREAM: ICD-10-CM

## 2021-02-27 DIAGNOSIS — N40.1 BENIGN PROSTATIC HYPERPLASIA WITH WEAK URINARY STREAM: ICD-10-CM

## 2021-03-01 RX ORDER — FINASTERIDE 5 MG/1
5 TABLET, FILM COATED ORAL DAILY
Qty: 90 TABLET | Refills: 3 | Status: SHIPPED | OUTPATIENT
Start: 2021-03-01 | End: 2022-02-03 | Stop reason: SDUPTHER

## 2021-03-01 RX ORDER — TAMSULOSIN HYDROCHLORIDE 0.4 MG/1
0.4 CAPSULE ORAL
Qty: 90 CAPSULE | Refills: 3 | Status: SHIPPED | OUTPATIENT
Start: 2021-03-01 | End: 2022-02-03 | Stop reason: SDUPTHER

## 2021-05-06 ENCOUNTER — OFFICE VISIT (OUTPATIENT)
Dept: MEDICAL GROUP | Facility: PHYSICIAN GROUP | Age: 56
End: 2021-05-06
Payer: COMMERCIAL

## 2021-05-06 ENCOUNTER — HOSPITAL ENCOUNTER (OUTPATIENT)
Dept: LAB | Facility: MEDICAL CENTER | Age: 56
End: 2021-05-06
Attending: FAMILY MEDICINE
Payer: COMMERCIAL

## 2021-05-06 VITALS
TEMPERATURE: 97.2 F | RESPIRATION RATE: 16 BRPM | OXYGEN SATURATION: 93 % | HEIGHT: 68 IN | DIASTOLIC BLOOD PRESSURE: 60 MMHG | BODY MASS INDEX: 22.07 KG/M2 | WEIGHT: 145.6 LBS | HEART RATE: 83 BPM | SYSTOLIC BLOOD PRESSURE: 100 MMHG

## 2021-05-06 DIAGNOSIS — D72.9 ABNORMAL WHITE BLOOD CELL (WBC) COUNT: ICD-10-CM

## 2021-05-06 DIAGNOSIS — H55.09: ICD-10-CM

## 2021-05-06 DIAGNOSIS — D70.8 OTHER NEUTROPENIA (HCC): ICD-10-CM

## 2021-05-06 DIAGNOSIS — N40.0 BENIGN PROSTATIC HYPERPLASIA WITHOUT LOWER URINARY TRACT SYMPTOMS: Chronic | ICD-10-CM

## 2021-05-06 PROBLEM — H00.012 HORDEOLUM EXTERNUM OF RIGHT LOWER EYELID: Status: RESOLVED | Noted: 2018-03-06 | Resolved: 2021-05-06

## 2021-05-06 LAB
BASOPHILS # BLD AUTO: 0.5 % (ref 0–1.8)
BASOPHILS # BLD: 0.02 K/UL (ref 0–0.12)
EOSINOPHIL # BLD AUTO: 0.04 K/UL (ref 0–0.51)
EOSINOPHIL NFR BLD: 0.9 % (ref 0–6.9)
ERYTHROCYTE [DISTWIDTH] IN BLOOD BY AUTOMATED COUNT: 41 FL (ref 35.9–50)
HCT VFR BLD AUTO: 46.8 % (ref 42–52)
HGB BLD-MCNC: 15.1 G/DL (ref 14–18)
IMM GRANULOCYTES # BLD AUTO: 0.01 K/UL (ref 0–0.11)
IMM GRANULOCYTES NFR BLD AUTO: 0.2 % (ref 0–0.9)
LYMPHOCYTES # BLD AUTO: 1.31 K/UL (ref 1–4.8)
LYMPHOCYTES NFR BLD: 29.7 % (ref 22–41)
MCH RBC QN AUTO: 29.8 PG (ref 27–33)
MCHC RBC AUTO-ENTMCNC: 32.3 G/DL (ref 33.7–35.3)
MCV RBC AUTO: 92.3 FL (ref 81.4–97.8)
MONOCYTES # BLD AUTO: 0.48 K/UL (ref 0–0.85)
MONOCYTES NFR BLD AUTO: 10.9 % (ref 0–13.4)
NEUTROPHILS # BLD AUTO: 2.55 K/UL (ref 1.82–7.42)
NEUTROPHILS NFR BLD: 57.8 % (ref 44–72)
NRBC # BLD AUTO: 0 K/UL
NRBC BLD-RTO: 0 /100 WBC
PLATELET # BLD AUTO: 195 K/UL (ref 164–446)
PMV BLD AUTO: 9.9 FL (ref 9–12.9)
RBC # BLD AUTO: 5.07 M/UL (ref 4.7–6.1)
WBC # BLD AUTO: 4.4 K/UL (ref 4.8–10.8)

## 2021-05-06 PROCEDURE — 85025 COMPLETE CBC W/AUTO DIFF WBC: CPT

## 2021-05-06 PROCEDURE — 99214 OFFICE O/P EST MOD 30 MIN: CPT | Performed by: FAMILY MEDICINE

## 2021-05-06 PROCEDURE — 36415 COLL VENOUS BLD VENIPUNCTURE: CPT

## 2021-05-06 ASSESSMENT — PATIENT HEALTH QUESTIONNAIRE - PHQ9: CLINICAL INTERPRETATION OF PHQ2 SCORE: 0

## 2021-05-06 ASSESSMENT — FIBROSIS 4 INDEX: FIB4 SCORE: 1.37

## 2021-05-06 NOTE — ASSESSMENT & PLAN NOTE
This is a chronic condition.  Patient is noted to have persistent leukopenia in the past.  Most recently checked January 2021.  Interestingly, I do notice that he did develop a neutropenia of 1.75 at that time with no prior history.

## 2021-05-06 NOTE — PROGRESS NOTES
"Subjective:     Chief Complaint   Patient presents with   • Establish Care       HPI:   Raul presents today to discuss the following.  Prior PCP: Dr. Vaz    Abnormal white blood cell (WBC) count  This is a chronic condition.  Patient is noted to have persistent leukopenia in the past.  Most recently checked January 2021.  Interestingly, I do notice that he did develop a neutropenia of 1.75 at that time with no prior history.    BPH (benign prostatic hypertrophy)  Chronic issue  On flomax and finasteride   Following up with urology    Barany's nystagmus  Chronic issue  Following up Dr Barajas  On Baclofen and Gabapentin       Past Medical History:   Diagnosis Date   • Barany's nystagmus 11/14/2011   • Brain tumor (HCC) 2011    in mother (?empendymoma), also glioblastoma in maternal aunt   • Hypertension    • Other specified disorder of intestines    • Snoring    • Vestibular disorder 11/14/2011       Current Outpatient Medications Ordered in Epic   Medication Sig Dispense Refill   • tamsulosin (FLOMAX) 0.4 MG capsule Take 1 capsule by mouth 1/2 hour after breakfast. 90 capsule 3   • finasteride (PROSCAR) 5 MG Tab Take 1 tablet by mouth every day. 90 tablet 3   • gabapentin (NEURONTIN) 300 MG Cap Take 1 Cap by mouth 2 Times a Day. 60 Cap 0   • baclofen (LIORESAL) 10 MG Tab Take 1 Tab by mouth 3 times a day. 90 Tab 0     No current Epic-ordered facility-administered medications on file.       Allergies:  Morphine    Health Maintenance: Completed    ROS:  Gen: no fevers/chills, no changes in weight  Eyes: no changes in vision  Pulm: no sob, no cough  CV: no chest pain, no palpitations  GI: no nausea/vomiting, no diarrhea      Objective:     Exam:  /60 (BP Location: Left arm, Patient Position: Sitting, BP Cuff Size: Adult)   Pulse 83   Temp 36.2 °C (97.2 °F) (Temporal)   Resp 16   Ht 1.727 m (5' 8\")   Wt 66 kg (145 lb 9.6 oz)   SpO2 93%   BMI 22.14 kg/m²  Body mass index is 22.14 " kg/m².      Constitutional: Alert, no distress, well-groomed.  Skin: Warm, dry, good turgor, no rashes in visible areas.  Eye: Equal, round and reactive, conjunctiva clear, lids normal.  ENMT: Lips without lesions, good dentition, moist mucous membranes.  Neck: Trachea midline, no masses, no thyromegaly.  Respiratory: Unlabored respiratory effort, no cough.  MSK: Normal gait, moves all extremities.  Neuro: Grossly non-focal.   Psych: Alert and oriented x3, normal affect and mood.        Assessment & Plan:     56 y.o. male with the following -     1. Other neutropenia (HCC)  2. Abnormal white blood cell (WBC) count  Chronic, worsening condition.  Patient has a longstanding history of leukopenia.  However, on most recent blood work January 2021 he is noted to have a mild neutropenia.  I would like to verify this to ensure that it is not a persistent or worsening problem.  He is amenable to that.  - CBC WITH DIFFERENTIAL; Future    3. Benign prostatic hyperplasia without lower urinary tract symptoms  Chronic, stable condition.  Complaints of dribbling.  To undergo cystoscopy with urology in the near future.  For now recommend he continues with tamsulosin and finasteride.    4. Barany's nystagmus  Chronic, stable condition.  Complication from ependymoma resection.  Follows up with neurosurgery and neuro-ophthalmology.  -Continue with follow-up and gabapentin and baclofen      No follow-ups on file.    Please note that this dictation was created using voice recognition software. I have made every reasonable attempt to correct obvious errors, but I expect that there are errors of grammar and possibly content that I did not discover before finalizing the note.

## 2021-06-04 ENCOUNTER — HOSPITAL ENCOUNTER (OUTPATIENT)
Facility: MEDICAL CENTER | Age: 56
End: 2021-06-04
Attending: FAMILY MEDICINE
Payer: COMMERCIAL

## 2021-06-04 ENCOUNTER — TELEPHONE (OUTPATIENT)
Dept: MEDICAL GROUP | Facility: PHYSICIAN GROUP | Age: 56
End: 2021-06-04

## 2021-06-04 DIAGNOSIS — Z12.11 COLON CANCER SCREENING: ICD-10-CM

## 2021-06-04 PROCEDURE — 82274 ASSAY TEST FOR BLOOD FECAL: CPT

## 2021-06-04 NOTE — TELEPHONE ENCOUNTER
VOICEMAIL  1. Caller Name: Raul Hinojosa                        Call Back Number: [phone] (home) [phone] (work)      2. Message: patient lvm stating he would like to get lab order for fecal test.     3. Patient approves office to leave a detailed voicemail/MyChart message: yes

## 2021-06-09 LAB — IMM ASSAY OCC BLD FITOB: NEGATIVE

## 2021-10-01 ENCOUNTER — HOSPITAL ENCOUNTER (OUTPATIENT)
Dept: LAB | Facility: MEDICAL CENTER | Age: 56
End: 2021-10-01
Attending: PSYCHIATRY & NEUROLOGY
Payer: COMMERCIAL

## 2021-10-01 LAB
ALBUMIN SERPL BCP-MCNC: 4.4 G/DL (ref 3.2–4.9)
ALBUMIN/GLOB SERPL: 1.3 G/DL
ALP SERPL-CCNC: 90 U/L (ref 30–99)
ALT SERPL-CCNC: 21 U/L (ref 2–50)
ANION GAP SERPL CALC-SCNC: 8 MMOL/L (ref 7–16)
AST SERPL-CCNC: 22 U/L (ref 12–45)
BASOPHILS # BLD AUTO: 0.5 % (ref 0–1.8)
BASOPHILS # BLD: 0.02 K/UL (ref 0–0.12)
BILIRUB SERPL-MCNC: 0.4 MG/DL (ref 0.1–1.5)
BUN SERPL-MCNC: 16 MG/DL (ref 8–22)
CALCIUM SERPL-MCNC: 9.9 MG/DL (ref 8.5–10.5)
CHLORIDE SERPL-SCNC: 104 MMOL/L (ref 96–112)
CO2 SERPL-SCNC: 29 MMOL/L (ref 20–33)
CREAT SERPL-MCNC: 0.98 MG/DL (ref 0.5–1.4)
EOSINOPHIL # BLD AUTO: 0.06 K/UL (ref 0–0.51)
EOSINOPHIL NFR BLD: 1.5 % (ref 0–6.9)
ERYTHROCYTE [DISTWIDTH] IN BLOOD BY AUTOMATED COUNT: 41.7 FL (ref 35.9–50)
GLOBULIN SER CALC-MCNC: 3.3 G/DL (ref 1.9–3.5)
GLUCOSE SERPL-MCNC: 97 MG/DL (ref 65–99)
HCT VFR BLD AUTO: 48.2 % (ref 42–52)
HGB BLD-MCNC: 15.5 G/DL (ref 14–18)
IMM GRANULOCYTES # BLD AUTO: 0.01 K/UL (ref 0–0.11)
IMM GRANULOCYTES NFR BLD AUTO: 0.3 % (ref 0–0.9)
LYMPHOCYTES # BLD AUTO: 1.75 K/UL (ref 1–4.8)
LYMPHOCYTES NFR BLD: 44.2 % (ref 22–41)
MCH RBC QN AUTO: 29.4 PG (ref 27–33)
MCHC RBC AUTO-ENTMCNC: 32.2 G/DL (ref 33.7–35.3)
MCV RBC AUTO: 91.3 FL (ref 81.4–97.8)
MONOCYTES # BLD AUTO: 0.44 K/UL (ref 0–0.85)
MONOCYTES NFR BLD AUTO: 11.1 % (ref 0–13.4)
NEUTROPHILS # BLD AUTO: 1.68 K/UL (ref 1.82–7.42)
NEUTROPHILS NFR BLD: 42.4 % (ref 44–72)
NRBC # BLD AUTO: 0 K/UL
NRBC BLD-RTO: 0 /100 WBC
PLATELET # BLD AUTO: 199 K/UL (ref 164–446)
PMV BLD AUTO: 9.7 FL (ref 9–12.9)
POTASSIUM SERPL-SCNC: 4.7 MMOL/L (ref 3.6–5.5)
PROT SERPL-MCNC: 7.7 G/DL (ref 6–8.2)
RBC # BLD AUTO: 5.28 M/UL (ref 4.7–6.1)
SODIUM SERPL-SCNC: 141 MMOL/L (ref 135–145)
WBC # BLD AUTO: 4 K/UL (ref 4.8–10.8)

## 2021-10-01 PROCEDURE — 80053 COMPREHEN METABOLIC PANEL: CPT

## 2021-10-01 PROCEDURE — 36415 COLL VENOUS BLD VENIPUNCTURE: CPT

## 2021-10-01 PROCEDURE — 85025 COMPLETE CBC W/AUTO DIFF WBC: CPT

## 2021-11-08 ENCOUNTER — APPOINTMENT (OUTPATIENT)
Dept: MEDICAL GROUP | Facility: PHYSICIAN GROUP | Age: 56
End: 2021-11-08
Payer: COMMERCIAL

## 2021-11-23 ENCOUNTER — OFFICE VISIT (OUTPATIENT)
Dept: MEDICAL GROUP | Facility: PHYSICIAN GROUP | Age: 56
End: 2021-11-23
Payer: COMMERCIAL

## 2021-11-23 ENCOUNTER — HOSPITAL ENCOUNTER (OUTPATIENT)
Dept: LAB | Facility: MEDICAL CENTER | Age: 56
End: 2021-11-23
Attending: FAMILY MEDICINE
Payer: COMMERCIAL

## 2021-11-23 VITALS
TEMPERATURE: 97.5 F | OXYGEN SATURATION: 93 % | WEIGHT: 149.8 LBS | HEIGHT: 68 IN | DIASTOLIC BLOOD PRESSURE: 68 MMHG | HEART RATE: 82 BPM | BODY MASS INDEX: 22.7 KG/M2 | SYSTOLIC BLOOD PRESSURE: 102 MMHG

## 2021-11-23 DIAGNOSIS — D72.9 ABNORMAL WHITE BLOOD CELL (WBC) COUNT: ICD-10-CM

## 2021-11-23 DIAGNOSIS — D70.8 OTHER NEUTROPENIA (HCC): ICD-10-CM

## 2021-11-23 DIAGNOSIS — E78.2 MIXED HYPERLIPIDEMIA: ICD-10-CM

## 2021-11-23 DIAGNOSIS — Z86.73 HISTORY OF STROKE: ICD-10-CM

## 2021-11-23 DIAGNOSIS — H55.09: ICD-10-CM

## 2021-11-23 LAB — HIV 1+2 AB+HIV1 P24 AG SERPL QL IA: NORMAL

## 2021-11-23 PROCEDURE — 36415 COLL VENOUS BLD VENIPUNCTURE: CPT

## 2021-11-23 PROCEDURE — 99214 OFFICE O/P EST MOD 30 MIN: CPT | Performed by: FAMILY MEDICINE

## 2021-11-23 PROCEDURE — 87389 HIV-1 AG W/HIV-1&-2 AB AG IA: CPT

## 2021-11-23 ASSESSMENT — FIBROSIS 4 INDEX: FIB4 SCORE: 1.4

## 2021-11-23 NOTE — ASSESSMENT & PLAN NOTE
Chronic issue  Noted to have fluctuating abs neutrophil count   Most recently 10/21 at 1.68  He is asymptomatic

## 2021-11-23 NOTE — ASSESSMENT & PLAN NOTE
Chronic issue  All related to previous stroke  On baclofen and and gabapentin  Follows up with neuro ophthalmology

## 2021-12-08 ENCOUNTER — OFFICE VISIT (OUTPATIENT)
Dept: HEMATOLOGY ONCOLOGY | Facility: MEDICAL CENTER | Age: 56
End: 2021-12-08
Payer: COMMERCIAL

## 2021-12-08 VITALS
SYSTOLIC BLOOD PRESSURE: 112 MMHG | BODY MASS INDEX: 23 KG/M2 | RESPIRATION RATE: 16 BRPM | HEIGHT: 68 IN | HEART RATE: 74 BPM | WEIGHT: 151.79 LBS | OXYGEN SATURATION: 96 % | TEMPERATURE: 97.7 F | DIASTOLIC BLOOD PRESSURE: 68 MMHG

## 2021-12-08 DIAGNOSIS — D72.819 LEUKOPENIA, UNSPECIFIED TYPE: ICD-10-CM

## 2021-12-08 PROCEDURE — 99203 OFFICE O/P NEW LOW 30 MIN: CPT | Performed by: STUDENT IN AN ORGANIZED HEALTH CARE EDUCATION/TRAINING PROGRAM

## 2021-12-08 RX ORDER — SODIUM FLUORIDE 6 MG/ML
PASTE, DENTIFRICE DENTAL
COMMUNITY
Start: 2021-09-23

## 2021-12-08 ASSESSMENT — ENCOUNTER SYMPTOMS
CHILLS: 0
COUGH: 0
SHORTNESS OF BREATH: 0
VOMITING: 0
SPUTUM PRODUCTION: 0
BACK PAIN: 0
DIZZINESS: 0
CONSTIPATION: 0
BRUISES/BLEEDS EASILY: 0
WEAKNESS: 0
TINGLING: 0
MYALGIAS: 0
HEARTBURN: 0
ORTHOPNEA: 0
FEVER: 0
DOUBLE VISION: 0
DIAPHORESIS: 0
BLURRED VISION: 0
PALPITATIONS: 0
ABDOMINAL PAIN: 0
INSOMNIA: 0
DIARRHEA: 0
HEADACHES: 0
SORE THROAT: 0
BLOOD IN STOOL: 0
WHEEZING: 0
NAUSEA: 0
SINUS PAIN: 0
WEIGHT LOSS: 0
NERVOUS/ANXIOUS: 0

## 2021-12-08 ASSESSMENT — FIBROSIS 4 INDEX: FIB4 SCORE: 1.4

## 2021-12-08 NOTE — PROGRESS NOTES
Consult:  Hematology/Oncology      Referring Physician: PCP  Primary Care:  Ricardo Yusuf M.D.    Diagnosis: Leukopenia    Chief Complaint:  Abnormal labs    History of Presenting Illness:  Raul Hinojosa is a 56 y.o.  man who presents to the clinic today for evaluation of chronic leukopenia.  The patient has had these abnormal lab values for the past 10 years.  His hemoglobin and platelet count have been normal.  In reviewing his neutrophil count, his neutrophils have hovered between 1.5 and 2 for this period of time.  He otherwise feels well.    The patient does have a history of a brain tumor that was removed in 2011, and has had some functional debility since that time.  We do not have any labs for review prior to 2011.    Interval History:  Patient is here for consultation.  He feels well overall, though he is anxious about being in the oncology clinic.    Past Medical History:   Diagnosis Date   • Barany's nystagmus 11/14/2011   • Brain tumor (HCC) 2011    in mother (?empendymoma), also glioblastoma in maternal aunt   • Hypertension    • Other specified disorder of intestines    • Snoring    • Vestibular disorder 11/14/2011       Past Surgical History:   Procedure Laterality Date   • GASTROSCOPY WITH BIOPSY  2/22/2011    Performed by BRITTANY VILLASENOR at SURGERY [facility] ORS   • CRANIOTOMY  2/7/2011    Performed by MADISON GÓMEZ at SURGERY [facility] ORS   • CERVICAL LAMINECTOMY POSTERIOR  2/7/2011    Performed by MADISON GÓMEZ at SURGERY [facility] ORS   • OTHER ORTHOPEDIC SURGERY  1996    shoulder surgery Right   • APPENDECTOMY  1983       Social History     Socioeconomic History   • Marital status:      Spouse name: Not on file   • Number of children: Not on file   • Years of education: Not on file   • Highest education level: Not on file   Occupational History   • Occupation: [employer]     Employer: [employer]   Tobacco Use   • Smoking status: Never  Smoker   • Smokeless tobacco: Never Used   Vaping Use   • Vaping Use: Never used   Substance and Sexual Activity   • Alcohol use: Not Currently     Alcohol/week: 0.0 oz   • Drug use: Never   • Sexual activity: Yes     Partners: Female     Comment: .    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 On disability. Lives with his wife. 3 children.      Social Determinants of Health     Financial Resource Strain:    • Difficulty of Paying Living Expenses: Not on file   Food Insecurity:    • Worried About Running Out of Food in the Last Year: Not on file   • Ran Out of Food in the Last Year: Not on file   Transportation Needs:    • Lack of Transportation (Medical): Not on file   • Lack of Transportation (Non-Medical): Not on file   Physical Activity:    • Days of Exercise per Week: Not on file   • Minutes of Exercise per Session: Not on file   Stress:    • Feeling of Stress : Not on file   Social Connections:    • Frequency of Communication with Friends and Family: Not on file   • Frequency of Social Gatherings with Friends and Family: Not on file   • Attends Judaism Services: Not on file   • Active Member of Clubs or Organizations: Not on file   • Attends Club or Organization Meetings: Not on file   • Marital Status: Not on file   Intimate Partner Violence:    • Fear of Current or Ex-Partner: Not on file   • Emotionally Abused: Not on file   • Physically Abused: Not on file   • Sexually Abused: Not on file   Housing Stability:    • Unable to Pay for Housing in the Last Year: Not on file   • Number of Places Lived in the Last Year: Not on file   • Unstable Housing in the Last Year: Not on file       Family History   Problem Relation Age of Onset   • Cancer Mother         brain tumor, chronic leukemia   • Hypertension Father    • Hypertension Sister        OB History   No obstetric history on file.       Allergies as of  "12/08/2021 - Reviewed 12/08/2021   Allergen Reaction Noted   • Morphine Vomiting 01/20/2011         Current Outpatient Medications:   •  PREVIDENT 5000 BOOSTER PLUS 1.1 % Paste, , Disp: , Rfl:   •  tamsulosin (FLOMAX) 0.4 MG capsule, Take 1 capsule by mouth 1/2 hour after breakfast., Disp: 90 capsule, Rfl: 3  •  finasteride (PROSCAR) 5 MG Tab, Take 1 tablet by mouth every day., Disp: 90 tablet, Rfl: 3  •  gabapentin (NEURONTIN) 300 MG Cap, Take 1 Cap by mouth 2 Times a Day., Disp: 60 Cap, Rfl: 0  •  baclofen (LIORESAL) 10 MG Tab, Take 1 Tab by mouth 3 times a day., Disp: 90 Tab, Rfl: 0    Review of Systems:  Review of Systems   Constitutional: Negative for chills, diaphoresis, fever, malaise/fatigue and weight loss.   HENT: Negative for hearing loss, nosebleeds, sinus pain and sore throat.    Eyes: Negative for blurred vision and double vision.   Respiratory: Negative for cough, sputum production, shortness of breath and wheezing.    Cardiovascular: Negative for chest pain, palpitations, orthopnea and leg swelling.   Gastrointestinal: Negative for abdominal pain, blood in stool, constipation, diarrhea, heartburn, melena, nausea and vomiting.   Genitourinary: Negative for dysuria, frequency, hematuria and urgency.   Musculoskeletal: Negative for back pain, joint pain and myalgias.   Skin: Negative for rash.   Neurological: Negative for dizziness, tingling, weakness and headaches.   Endo/Heme/Allergies: Does not bruise/bleed easily.   Psychiatric/Behavioral: The patient is not nervous/anxious and does not have insomnia.           Physical Exam:  Vitals:    12/08/21 0917   BP: 112/68   Pulse: 74   Resp: 16   Temp: 36.5 °C (97.7 °F)   TempSrc: Temporal   SpO2: 96%   Weight: 68.9 kg (151 lb 12.6 oz)   Height: 1.727 m (5' 7.99\")       DESC; KARNOFSKY SCALE WITH ECOG EQUIVALENT: 90, Able to carry on normal activity; minor signs or symptoms of disease (ECOG equivalent 0)    DISTRESS LEVEL: no apparent distress    Physical " Exam  Vitals and nursing note reviewed.   Constitutional:       General: He is awake. He is not in acute distress.     Appearance: Normal appearance. He is normal weight. He is not ill-appearing, toxic-appearing or diaphoretic.   HENT:      Head: Normocephalic and atraumatic.      Nose: Nose normal. No congestion.      Mouth/Throat:      Pharynx: Oropharynx is clear. No oropharyngeal exudate or posterior oropharyngeal erythema.   Eyes:      General: No scleral icterus.     Extraocular Movements: Extraocular movements intact.      Conjunctiva/sclera: Conjunctivae normal.      Pupils: Pupils are equal, round, and reactive to light.   Cardiovascular:      Rate and Rhythm: Normal rate and regular rhythm.      Pulses: Normal pulses.      Heart sounds: Normal heart sounds. No murmur heard.  No friction rub. No gallop.    Pulmonary:      Effort: Pulmonary effort is normal.      Breath sounds: Normal breath sounds. No decreased air movement. No wheezing, rhonchi or rales.   Chest:   Breasts:      Right: No axillary adenopathy.      Left: No axillary adenopathy.       Abdominal:      General: Bowel sounds are normal. There is no distension.      Tenderness: There is no abdominal tenderness.   Musculoskeletal:         General: No deformity. Normal range of motion.      Cervical back: Normal range of motion and neck supple. No tenderness.      Right lower leg: No edema.      Left lower leg: No edema.   Lymphadenopathy:      Cervical: No cervical adenopathy.      Upper Body:      Right upper body: No axillary adenopathy.      Left upper body: No axillary adenopathy.      Lower Body: No right inguinal adenopathy. No left inguinal adenopathy.   Skin:     General: Skin is warm and dry.      Coloration: Skin is not jaundiced.      Findings: No erythema or rash.   Neurological:      General: No focal deficit present.      Mental Status: He is alert and oriented to person, place, and time.      Cranial Nerves: Cranial nerves are  intact.      Sensory: Sensation is intact.      Motor: Motor function is intact. No weakness.      Gait: Gait is intact.   Psychiatric:         Attention and Perception: Attention normal.         Mood and Affect: Mood normal.         Behavior: Behavior normal. Behavior is cooperative.         Thought Content: Thought content normal.         Judgment: Judgment normal.          Depression Screening    Little interest or pleasure in doing things?      Feeling down, depressed , or hopeless?     Trouble falling or staying asleep, or sleeping too much?      Feeling tired or having little energy?      Poor appetite or overeating?      Feeling bad about yourself - or that you are a failure or have let yourself or your family down?     Trouble concentrating on things, such as reading the newspaper or watching television?     Moving or speaking so slowly that other people could have noticed.  Or the opposite - being so fidgety or restless that you have been moving around a lot more than usual?      Thoughts that you would be better off dead, or of hurting yourself?      Patient Health Questionnaire Score:         If depressive symptoms identified deferred to follow up visit unless specifically addressed in assesment and plan.    Interpretation of PHQ-9 Total Score   Score Severity   1-4 No Depression   5-9 Mild Depression   10-14 Moderate Depression   15-19 Moderately Severe Depression   20-27 Severe Depression    Labs:  No visits with results within 7 Day(s) from this visit.   Latest known visit with results is:   Hospital Outpatient Visit on 11/23/2021   Component Date Value Ref Range Status   • HIV Ag/Ab Combo Assay 11/23/2021 Non-Reactive  Non Reactive Final    Comment: [employer] HIV is a diagnostic test for the qualitative determination of HIV-1  p24 antigen and antibodies to HIV-1 (HIV-1 groups M and O) and HIV-2 in  human serum and plasma. A negative screen does not preclude the possibility  of exposure or infection.  Consider retesting in high-risk patients, if  clinically indicated.         Imaging:   NA    Pathology:  NA    Assessment & Plan:  1. Leukopenia, unspecified type         This is a 56-year-old  man leukopenia that appears to be chronic in nature.  He does not have any notable infectious history or any trends noting a worsening of his blood counts.  This may have been something that has been going on for his entire life, which would not be uncommon.    The patient is not currently neutropenic in any way, and would not need any specific neutropenic precautions.  It is likely that his counts have consistently been this low, and he is currently not in any danger of opportunistic infections.    I advised him that there is no indication for further work-up of these counts at this time.  He will be able to continue to follow with routine labs with his primary physician, and I advised that he could return in follow-up as needed if there are any changes.    1. No further work up required at this time. This appears to be a chronic leukopenia that has not changed in the past 10 years. The patient may continue to be observed and may follow up with us again as needed if there are any significant changes.     Any questions and concerns raised by the patient were answered to the best of my ability. Thank you for allowing me to participate in the care for this patient. Please feel free to contact me for any questions or concerns.     Total time spent on chart review, clinic encounter, and documentation: 31 minutes.

## 2022-02-03 ENCOUNTER — PATIENT MESSAGE (OUTPATIENT)
Dept: MEDICAL GROUP | Facility: PHYSICIAN GROUP | Age: 57
End: 2022-02-03

## 2022-02-03 DIAGNOSIS — N40.1 BENIGN PROSTATIC HYPERPLASIA WITH WEAK URINARY STREAM: ICD-10-CM

## 2022-02-03 DIAGNOSIS — R39.12 BENIGN PROSTATIC HYPERPLASIA WITH WEAK URINARY STREAM: ICD-10-CM

## 2022-02-03 RX ORDER — TAMSULOSIN HYDROCHLORIDE 0.4 MG/1
0.4 CAPSULE ORAL
Qty: 90 CAPSULE | Refills: 3 | Status: SHIPPED | OUTPATIENT
Start: 2022-02-03 | End: 2023-02-23 | Stop reason: SDUPTHER

## 2022-02-03 RX ORDER — FINASTERIDE 5 MG/1
5 TABLET, FILM COATED ORAL DAILY
Qty: 90 TABLET | Refills: 3 | Status: SHIPPED | OUTPATIENT
Start: 2022-02-03 | End: 2023-02-23 | Stop reason: SDUPTHER

## 2022-02-03 RX ORDER — FINASTERIDE 5 MG/1
5 TABLET, FILM COATED ORAL DAILY
Qty: 90 TABLET | Refills: 3 | Status: CANCELLED | OUTPATIENT
Start: 2022-02-03

## 2022-02-03 RX ORDER — TAMSULOSIN HYDROCHLORIDE 0.4 MG/1
0.4 CAPSULE ORAL
Qty: 90 CAPSULE | Refills: 3 | Status: CANCELLED | OUTPATIENT
Start: 2022-02-03

## 2022-02-03 NOTE — TELEPHONE ENCOUNTER
Received request via: Patient    Was the patient seen in the last year in this department? Yes    Does the patient have an active prescription (recently filled or refills available) for medication(s) requested? No     Patient requesting medications be sent to [facility] pharmacy instead of [facility].

## 2022-02-15 ENCOUNTER — PHARMACY VISIT (OUTPATIENT)
Dept: PHARMACY | Facility: MEDICAL CENTER | Age: 57
End: 2022-02-15
Payer: COMMERCIAL

## 2022-02-15 PROCEDURE — RXMED WILLOW AMBULATORY MEDICATION CHARGE: Performed by: INTERNAL MEDICINE

## 2022-02-15 RX ORDER — CX-024414 0.2 MG/ML
INJECTION, SUSPENSION INTRAMUSCULAR
Qty: 0.25 ML | Refills: 0 | Status: SHIPPED | OUTPATIENT
Start: 2022-02-15 | End: 2022-03-25

## 2022-03-02 ENCOUNTER — HOSPITAL ENCOUNTER (OUTPATIENT)
Dept: RADIOLOGY | Facility: MEDICAL CENTER | Age: 57
End: 2022-03-02
Attending: NURSE PRACTITIONER
Payer: COMMERCIAL

## 2022-03-02 DIAGNOSIS — D43.1: ICD-10-CM

## 2022-03-02 PROCEDURE — 70553 MRI BRAIN STEM W/O & W/DYE: CPT

## 2022-03-02 PROCEDURE — 700117 HCHG RX CONTRAST REV CODE 255: Performed by: NURSE PRACTITIONER

## 2022-03-02 PROCEDURE — 72156 MRI NECK SPINE W/O & W/DYE: CPT

## 2022-03-02 PROCEDURE — A9576 INJ PROHANCE MULTIPACK: HCPCS | Performed by: NURSE PRACTITIONER

## 2022-03-02 RX ADMIN — GADOTERIDOL 1 ML: 279.3 INJECTION, SOLUTION INTRAVENOUS at 09:36

## 2022-03-24 ENCOUNTER — HOSPITAL ENCOUNTER (OUTPATIENT)
Dept: RADIOLOGY | Facility: MEDICAL CENTER | Age: 57
End: 2022-03-24
Attending: ORTHOPAEDIC SURGERY
Payer: COMMERCIAL

## 2022-03-24 DIAGNOSIS — M25.561 CHRONIC PAIN OF BOTH KNEES: ICD-10-CM

## 2022-03-24 DIAGNOSIS — M25.562 LEFT KNEE PAIN, UNSPECIFIED CHRONICITY: ICD-10-CM

## 2022-03-24 DIAGNOSIS — M25.562 CHRONIC PAIN OF BOTH KNEES: ICD-10-CM

## 2022-03-24 DIAGNOSIS — M25.561 RIGHT KNEE PAIN, UNSPECIFIED CHRONICITY: ICD-10-CM

## 2022-03-24 DIAGNOSIS — G89.29 CHRONIC PAIN OF BOTH KNEES: ICD-10-CM

## 2022-03-24 PROCEDURE — 73562 X-RAY EXAM OF KNEE 3: CPT | Mod: LT

## 2022-03-24 PROCEDURE — 73565 X-RAY EXAM OF KNEES: CPT

## 2022-03-25 ENCOUNTER — OFFICE VISIT (OUTPATIENT)
Dept: MEDICAL GROUP | Facility: PHYSICIAN GROUP | Age: 57
End: 2022-03-25
Payer: COMMERCIAL

## 2022-03-25 VITALS
SYSTOLIC BLOOD PRESSURE: 118 MMHG | RESPIRATION RATE: 16 BRPM | TEMPERATURE: 97.7 F | WEIGHT: 150 LBS | DIASTOLIC BLOOD PRESSURE: 72 MMHG | HEIGHT: 68 IN | BODY MASS INDEX: 22.73 KG/M2 | HEART RATE: 92 BPM | OXYGEN SATURATION: 92 %

## 2022-03-25 DIAGNOSIS — Z85.841 HISTORY OF EPENDYMOMA OF BRAIN: ICD-10-CM

## 2022-03-25 DIAGNOSIS — N40.0 BENIGN PROSTATIC HYPERPLASIA WITHOUT LOWER URINARY TRACT SYMPTOMS: Chronic | ICD-10-CM

## 2022-03-25 DIAGNOSIS — H81.90 DISORDER OF VESTIBULAR FUNCTION, UNSPECIFIED LATERALITY: ICD-10-CM

## 2022-03-25 DIAGNOSIS — Z86.73 HISTORY OF STROKE: ICD-10-CM

## 2022-03-25 DIAGNOSIS — L98.9 SKIN LESION: ICD-10-CM

## 2022-03-25 DIAGNOSIS — H55.09: ICD-10-CM

## 2022-03-25 PROCEDURE — 99214 OFFICE O/P EST MOD 30 MIN: CPT | Performed by: FAMILY MEDICINE

## 2022-03-25 RX ORDER — AZITHROMYCIN 500 MG/1
500 TABLET, FILM COATED ORAL DAILY
Qty: 5 TABLET | Refills: 0 | Status: SHIPPED | OUTPATIENT
Start: 2022-03-25 | End: 2023-10-04 | Stop reason: SDUPTHER

## 2022-03-25 ASSESSMENT — FIBROSIS 4 INDEX: FIB4 SCORE: 1.42

## 2022-03-25 NOTE — ASSESSMENT & PLAN NOTE
Chronic issue  All related to previous stroke from 2011  On baclofen and Neurontin  Has seen neuro ophthalmology with Dr Barajas.   Is on long term disability for this. Requires form certification annually for this issue.   Last time he filled this out was 8/21.

## 2022-03-25 NOTE — PROGRESS NOTES
Subjective:     Chief Complaint   Patient presents with   • Nevus   • Results       HPI:   Raul presents today to discuss the following.    Manav's nystagmus  Chronic issue  All related to previous stroke from 2011  On baclofen and Neurontin  Has seen neuro ophthalmology with Dr Barajas.   Is on long term disability for this. Requires form certification annually for this issue.   Last time he filled this out was 8/21.    Vestibular disorder  Chronic issue  On vestibular ocular reflex.     Skin lesion  Chronic issue  Has had mole to his pelvic region  Growing now  Resembles SK    BPH (benign prostatic hypertrophy)  Chronic issue  Following up with urology  On flomax and finateride  Considering urolift       Past Medical History:   Diagnosis Date   • Manav's nystagmus 11/14/2011   • Brain tumor (HCC) 2011    in mother (?empendymoma), also glioblastoma in maternal aunt   • Hypertension    • Other specified disorder of intestines    • Snoring    • Vestibular disorder 11/14/2011       Current Outpatient Medications Ordered in Epic   Medication Sig Dispense Refill   • azithromycin (ZITHROMAX) 500 MG tablet Take 1 Tablet by mouth every day for 5 days. 5 Tablet 0   • finasteride (PROSCAR) 5 MG Tab Take 1 Tablet by mouth every day. 90 Tablet 3   • tamsulosin (FLOMAX) 0.4 MG capsule Take 1 Capsule by mouth 1/2 hour after breakfast. 90 Capsule 3   • PREVIDENT 5000 BOOSTER PLUS 1.1 % Paste      • gabapentin (NEURONTIN) 300 MG Cap Take 1 Cap by mouth 2 Times a Day. 60 Cap 0   • baclofen (LIORESAL) 10 MG Tab Take 1 Tab by mouth 3 times a day. 90 Tab 0     No current Epic-ordered facility-administered medications on file.       Allergies:  Morphine    Health Maintenance: Completed    ROS:  Gen: no fevers/chills, no changes in weight  Eyes: no changes in vision  Pulm: no sob, no cough  CV: no chest pain, no palpitations  GI: no nausea/vomiting, no diarrhea      Objective:     Exam:  /72 (BP Location: Left arm, Patient  "Position: Sitting, BP Cuff Size: Adult)   Pulse 92   Temp 36.5 °C (97.7 °F) (Temporal)   Resp 16   Ht 1.727 m (5' 7.99\")   Wt 68 kg (150 lb)   SpO2 92%   BMI 22.81 kg/m²  Body mass index is 22.81 kg/m².      Constitutional: Alert, no distress, well-groomed.  Skin: Warm, dry, good turgor, no rashes in visible areas.  Eye: Equal, round and reactive, conjunctiva clear, lids normal.  ENMT: Lips without lesions, good dentition, moist mucous membranes.  Neck: Trachea midline, no masses, no thyromegaly.  Respiratory: Unlabored respiratory effort, no cough.  MSK: Normal gait, moves all extremities.  Hyperkeratotic with stuck on appearance localized to the anterior pelvic region.  Measures approximately 2 cm in diameter.  Neuro: Grossly non-focal.   Psych: Alert and oriented x3, normal affect and mood.        Assessment & Plan:     57 y.o. male with the following -     1. Barany's nystagmus  2. Disorder of vestibular function, unspecified laterality  3. History of stroke  4. History of ependymoma of brain  Chronic condition.  Stable.  Has follow-up with neuro-ophthalmology.  He suffers from persistent cognitive issues due to a stroke in 2011.  As such, he is on disability for this.  He requires annual certification.  Asking of me to resume these as neuro-ophthalmology has signed off.  I will continue with the paperwork when it is due as long as his condition remained stable.  He verbalized understanding.  For now continue with baclofen and gabapentin.    5. Skin lesion  Chronic issue.  Etiology most likely seborrheic keratosis.  We will continue to monitor this.  Offer referral to dermatology but he would like to hold off.    6. Benign prostatic hyperplasia without lower urinary tract symptoms  Chronic, stable condition.  Continue to follow-up with urology.  I would recommend UroLift.      Return in about 6 months (around 9/25/2022).    Please note that this dictation was created using voice recognition software. I " have made every reasonable attempt to correct obvious errors, but I expect that there are errors of grammar and possibly content that I did not discover before finalizing the note.

## 2022-03-31 ENCOUNTER — HOSPITAL ENCOUNTER (OUTPATIENT)
Dept: RADIOLOGY | Facility: MEDICAL CENTER | Age: 57
End: 2022-03-31
Attending: ORTHOPAEDIC SURGERY
Payer: COMMERCIAL

## 2022-03-31 DIAGNOSIS — S83.242A ACUTE MEDIAL MENISCUS TEAR OF LEFT KNEE, INITIAL ENCOUNTER: ICD-10-CM

## 2022-03-31 PROCEDURE — 73721 MRI JNT OF LWR EXTRE W/O DYE: CPT | Mod: LT

## 2022-09-09 SDOH — HEALTH STABILITY: MENTAL HEALTH
STRESS IS WHEN SOMEONE FEELS TENSE, NERVOUS, ANXIOUS, OR CAN'T SLEEP AT NIGHT BECAUSE THEIR MIND IS TROUBLED. HOW STRESSED ARE YOU?: ONLY A LITTLE

## 2022-09-09 SDOH — ECONOMIC STABILITY: FOOD INSECURITY: WITHIN THE PAST 12 MONTHS, THE FOOD YOU BOUGHT JUST DIDN'T LAST AND YOU DIDN'T HAVE MONEY TO GET MORE.: NEVER TRUE

## 2022-09-09 SDOH — ECONOMIC STABILITY: HOUSING INSECURITY
IN THE LAST 12 MONTHS, WAS THERE A TIME WHEN YOU DID NOT HAVE A STEADY PLACE TO SLEEP OR SLEPT IN A SHELTER (INCLUDING NOW)?: NO

## 2022-09-09 SDOH — ECONOMIC STABILITY: INCOME INSECURITY: IN THE LAST 12 MONTHS, WAS THERE A TIME WHEN YOU WERE NOT ABLE TO PAY THE MORTGAGE OR RENT ON TIME?: NO

## 2022-09-09 SDOH — HEALTH STABILITY: PHYSICAL HEALTH: ON AVERAGE, HOW MANY MINUTES DO YOU ENGAGE IN EXERCISE AT THIS LEVEL?: 20 MIN

## 2022-09-09 SDOH — ECONOMIC STABILITY: FOOD INSECURITY: WITHIN THE PAST 12 MONTHS, YOU WORRIED THAT YOUR FOOD WOULD RUN OUT BEFORE YOU GOT MONEY TO BUY MORE.: NEVER TRUE

## 2022-09-09 SDOH — ECONOMIC STABILITY: TRANSPORTATION INSECURITY
IN THE PAST 12 MONTHS, HAS THE LACK OF TRANSPORTATION KEPT YOU FROM MEDICAL APPOINTMENTS OR FROM GETTING MEDICATIONS?: NO

## 2022-09-09 SDOH — HEALTH STABILITY: PHYSICAL HEALTH: ON AVERAGE, HOW MANY DAYS PER WEEK DO YOU ENGAGE IN MODERATE TO STRENUOUS EXERCISE (LIKE A BRISK WALK)?: 2 DAYS

## 2022-09-09 SDOH — ECONOMIC STABILITY: HOUSING INSECURITY: IN THE LAST 12 MONTHS, HOW MANY PLACES HAVE YOU LIVED?: 1

## 2022-09-09 SDOH — ECONOMIC STABILITY: TRANSPORTATION INSECURITY
IN THE PAST 12 MONTHS, HAS LACK OF RELIABLE TRANSPORTATION KEPT YOU FROM MEDICAL APPOINTMENTS, MEETINGS, WORK OR FROM GETTING THINGS NEEDED FOR DAILY LIVING?: NO

## 2022-09-09 SDOH — ECONOMIC STABILITY: TRANSPORTATION INSECURITY
IN THE PAST 12 MONTHS, HAS LACK OF TRANSPORTATION KEPT YOU FROM MEETINGS, WORK, OR FROM GETTING THINGS NEEDED FOR DAILY LIVING?: NO

## 2022-09-09 ASSESSMENT — SOCIAL DETERMINANTS OF HEALTH (SDOH)
HOW OFTEN DO YOU ATTENT MEETINGS OF THE CLUB OR ORGANIZATION YOU BELONG TO?: PATIENT DECLINED
HOW OFTEN DO YOU ATTEND CHURCH OR RELIGIOUS SERVICES?: MORE THAN 4 TIMES PER YEAR
IN A TYPICAL WEEK, HOW MANY TIMES DO YOU TALK ON THE PHONE WITH FAMILY, FRIENDS, OR NEIGHBORS?: MORE THAN THREE TIMES A WEEK
HOW MANY DRINKS CONTAINING ALCOHOL DO YOU HAVE ON A TYPICAL DAY WHEN YOU ARE DRINKING: PATIENT DOES NOT DRINK
HOW OFTEN DO YOU HAVE SIX OR MORE DRINKS ON ONE OCCASION: NEVER
DO YOU BELONG TO ANY CLUBS OR ORGANIZATIONS SUCH AS CHURCH GROUPS UNIONS, FRATERNAL OR ATHLETIC GROUPS, OR SCHOOL GROUPS?: PATIENT DECLINED
IN A TYPICAL WEEK, HOW MANY TIMES DO YOU TALK ON THE PHONE WITH FAMILY, FRIENDS, OR NEIGHBORS?: MORE THAN THREE TIMES A WEEK
HOW OFTEN DO YOU ATTENT MEETINGS OF THE CLUB OR ORGANIZATION YOU BELONG TO?: PATIENT DECLINED
DO YOU BELONG TO ANY CLUBS OR ORGANIZATIONS SUCH AS CHURCH GROUPS UNIONS, FRATERNAL OR ATHLETIC GROUPS, OR SCHOOL GROUPS?: PATIENT DECLINED
HOW OFTEN DO YOU GET TOGETHER WITH FRIENDS OR RELATIVES?: ONCE A WEEK
HOW OFTEN DO YOU ATTEND CHURCH OR RELIGIOUS SERVICES?: MORE THAN 4 TIMES PER YEAR
HOW OFTEN DO YOU HAVE A DRINK CONTAINING ALCOHOL: NEVER
WITHIN THE PAST 12 MONTHS, YOU WORRIED THAT YOUR FOOD WOULD RUN OUT BEFORE YOU GOT THE MONEY TO BUY MORE: NEVER TRUE
HOW OFTEN DO YOU GET TOGETHER WITH FRIENDS OR RELATIVES?: ONCE A WEEK

## 2022-09-09 ASSESSMENT — LIFESTYLE VARIABLES
AUDIT-C TOTAL SCORE: 0
HOW MANY STANDARD DRINKS CONTAINING ALCOHOL DO YOU HAVE ON A TYPICAL DAY: PATIENT DOES NOT DRINK
HOW OFTEN DO YOU HAVE SIX OR MORE DRINKS ON ONE OCCASION: NEVER
SKIP TO QUESTIONS 9-10: 1
HOW OFTEN DO YOU HAVE A DRINK CONTAINING ALCOHOL: NEVER

## 2022-09-12 ENCOUNTER — OFFICE VISIT (OUTPATIENT)
Dept: MEDICAL GROUP | Facility: PHYSICIAN GROUP | Age: 57
End: 2022-09-12
Payer: COMMERCIAL

## 2022-09-12 VITALS
DIASTOLIC BLOOD PRESSURE: 66 MMHG | HEIGHT: 68 IN | OXYGEN SATURATION: 92 % | SYSTOLIC BLOOD PRESSURE: 110 MMHG | BODY MASS INDEX: 22.73 KG/M2 | HEART RATE: 79 BPM | WEIGHT: 150 LBS | TEMPERATURE: 98 F

## 2022-09-12 DIAGNOSIS — H81.90 DISORDER OF VESTIBULAR FUNCTION, UNSPECIFIED LATERALITY: ICD-10-CM

## 2022-09-12 DIAGNOSIS — Z85.841 HISTORY OF EPENDYMOMA OF BRAIN: ICD-10-CM

## 2022-09-12 DIAGNOSIS — Z86.73 HISTORY OF STROKE: ICD-10-CM

## 2022-09-12 DIAGNOSIS — N40.0 BENIGN PROSTATIC HYPERPLASIA WITHOUT LOWER URINARY TRACT SYMPTOMS: Chronic | ICD-10-CM

## 2022-09-12 DIAGNOSIS — H55.09: ICD-10-CM

## 2022-09-12 PROCEDURE — 99214 OFFICE O/P EST MOD 30 MIN: CPT | Performed by: FAMILY MEDICINE

## 2022-09-12 ASSESSMENT — PATIENT HEALTH QUESTIONNAIRE - PHQ9: CLINICAL INTERPRETATION OF PHQ2 SCORE: 0

## 2022-09-12 ASSESSMENT — FIBROSIS 4 INDEX: FIB4 SCORE: 1.42

## 2022-09-12 NOTE — PROGRESS NOTES
"Subjective:     Chief Complaint   Patient presents with    Medication Management    Paperwork       HPI:   Raul presents today to discuss the following.    Manav's nystagmus  Chronic issue  All related to previous stroke from 2011  On baclofen and Neurontin  Has seen neuro ophthalmology with Dr Barajas.   Is on long term disability for this. Requires form certification annually for this issue.   Last time he filled this out was 8/21.    BPH (benign prostatic hypertrophy)  Chronic issue  Considering urolift  Following up with urology     Past Medical History:   Diagnosis Date    Manav's nystagmus 11/14/2011    Brain tumor (HCC) 2011    in mother (?empendymoma), also glioblastoma in maternal aunt    Hypertension     Other specified disorder of intestines     Snoring     Vestibular disorder 11/14/2011       Current Outpatient Medications Ordered in Epic   Medication Sig Dispense Refill    finasteride (PROSCAR) 5 MG Tab Take 1 Tablet by mouth every day. 90 Tablet 3    tamsulosin (FLOMAX) 0.4 MG capsule Take 1 Capsule by mouth 1/2 hour after breakfast. 90 Capsule 3    PREVIDENT 5000 BOOSTER PLUS 1.1 % Paste       gabapentin (NEURONTIN) 300 MG Cap Take 1 Cap by mouth 2 Times a Day. (Patient taking differently: Take 300 mg by mouth 4 times a day.) 60 Cap 0    baclofen (LIORESAL) 10 MG Tab Take 1 Tab by mouth 3 times a day. (Patient taking differently: Take 10 mg by mouth 3 times a day. 1 tab twice a day) 90 Tab 0     No current Epic-ordered facility-administered medications on file.       Allergies:  Morphine    Health Maintenance: Completed    ROS:  Gen: no fevers/chills, no changes in weight  Eyes: no changes in vision  Pulm: no sob, no cough  CV: no chest pain, no palpitations  GI: no nausea/vomiting, no diarrhea      Objective:     Exam:  /66 (BP Location: Right arm, Patient Position: Sitting, BP Cuff Size: Adult)   Pulse 79   Temp 36.7 °C (98 °F) (Temporal)   Ht 1.727 m (5' 7.99\")   Wt 68 kg (150 lb)   " SpO2 92%   BMI 22.81 kg/m²  Body mass index is 22.81 kg/m².      Constitutional: Alert, no distress, well-groomed.  Skin: Warm, dry, good turgor, no rashes in visible areas.  Eye: Equal, round and reactive, conjunctiva clear, lids normal.  ENMT: Lips without lesions, good dentition, moist mucous membranes.  Neck: Trachea midline, no masses, no thyromegaly.  Respiratory: Unlabored respiratory effort, no cough.  MSK: Normal gait, moves all extremities.  Neuro: Grossly non-focal.   Psych: Alert and oriented x3, normal affect and mood.        Assessment & Plan:     57 y.o. male with the following -     1. Barany's nystagmus    2. Disorder of vestibular function, unspecified laterality    3. History of stroke    4. History of ependymoma of brain  Chronic condition.  Neuro-ophthalmology has signed off.  Stable at this time.  On gabapentin and baclofen.  Requesting renewal of his permanent disability.  I will go ahead and authorize this today.    5. Benign prostatic hyperplasia without lower urinary tract symptoms  Chronic condition.  Continue with urology follow-up.      Return in about 6 months (around 3/12/2023).          Please note that this dictation was created using voice recognition software. I have made every reasonable attempt to correct obvious errors, but I expect that there are errors of grammar and possibly content that I did not discover before finalizing the note.

## 2022-11-10 PROCEDURE — RXMED WILLOW AMBULATORY MEDICATION CHARGE: Performed by: FAMILY MEDICINE

## 2022-11-15 ENCOUNTER — PHARMACY VISIT (OUTPATIENT)
Dept: PHARMACY | Facility: MEDICAL CENTER | Age: 57
End: 2022-11-15
Payer: COMMERCIAL

## 2022-12-08 PROCEDURE — RXMED WILLOW AMBULATORY MEDICATION CHARGE: Performed by: FAMILY MEDICINE

## 2022-12-09 ENCOUNTER — PHARMACY VISIT (OUTPATIENT)
Dept: PHARMACY | Facility: MEDICAL CENTER | Age: 57
End: 2022-12-09
Payer: COMMERCIAL

## 2022-12-22 ENCOUNTER — TELEMEDICINE (OUTPATIENT)
Dept: MEDICAL GROUP | Facility: PHYSICIAN GROUP | Age: 57
End: 2022-12-22
Payer: COMMERCIAL

## 2022-12-22 VITALS — WEIGHT: 148 LBS | BODY MASS INDEX: 21.92 KG/M2 | HEIGHT: 69 IN

## 2022-12-22 DIAGNOSIS — J06.9 UPPER RESPIRATORY TRACT INFECTION, UNSPECIFIED TYPE: ICD-10-CM

## 2022-12-22 LAB
EXTERNAL QUALITY CONTROL: ABNORMAL
FLUAV+FLUBV AG SPEC QL IA: POSITIVE
INT CON NEG: ABNORMAL
INT CON NEG: NORMAL
INT CON NEG: NORMAL
INT CON POS: ABNORMAL
INT CON POS: NORMAL
INT CON POS: NORMAL
S PYO AG THROAT QL: NEGATIVE
SARS-COV+SARS-COV-2 AG RESP QL IA.RAPID: POSITIVE

## 2022-12-22 PROCEDURE — 99213 OFFICE O/P EST LOW 20 MIN: CPT | Mod: CS,95 | Performed by: FAMILY MEDICINE

## 2022-12-22 PROCEDURE — 87804 INFLUENZA ASSAY W/OPTIC: CPT | Performed by: FAMILY MEDICINE

## 2022-12-22 PROCEDURE — 87426 SARSCOV CORONAVIRUS AG IA: CPT | Performed by: FAMILY MEDICINE

## 2022-12-22 PROCEDURE — 87880 STREP A ASSAY W/OPTIC: CPT | Performed by: FAMILY MEDICINE

## 2022-12-22 RX ORDER — OSELTAMIVIR PHOSPHATE 75 MG/1
75 CAPSULE ORAL 2 TIMES DAILY
Qty: 10 CAPSULE | Refills: 0 | Status: SHIPPED
Start: 2022-12-22 | End: 2023-03-16

## 2022-12-22 RX ORDER — BENZONATATE 100 MG/1
100 CAPSULE ORAL 3 TIMES DAILY PRN
Qty: 60 CAPSULE | Refills: 0 | Status: SHIPPED
Start: 2022-12-22 | End: 2023-03-16

## 2022-12-22 ASSESSMENT — FIBROSIS 4 INDEX: FIB4 SCORE: 1.42

## 2022-12-22 NOTE — PROGRESS NOTES
Virtual Visit: Established Patient   This visit was conducted via Zoom using secure and encrypted videoconferencing technology.   The patient was in their home in the state [facility].    The patient's identity was confirmed and verbal consent was obtained for this virtual visit.     Subjective:   CC:   Chief Complaint   Patient presents with    Other     Starting Monday: congestion, headache, chills, temp of 100F last night, cough, sore throat       Raul Hinojosa is a 57 y.o. male presenting for evaluation and management of:    #URI  New problem  Has been sick since this past Monday  Having congestion, headache, chills fever and cough  Tested positive for flu and covid    ROS   neg    Current medicines (including changes today)  Current Outpatient Medications   Medication Sig Dispense Refill    Nirmatrelvir&Ritonavir 300/100 20 x 150 MG & 10 x 100MG Tablet Therapy Pack Take 300 mg nirmatrelvir (two 150 mg tablets) with 100 mg ritonavir (one 100 mg tablet) by mouth, with all three tablets taken together twice daily for 5 days. 30 Each 0    oseltamivir (TAMIFLU) 75 MG Cap Take 1 Capsule by mouth 2 times a day. 10 Capsule 0    benzonatate (TESSALON) 100 MG Cap Take 1 Capsule by mouth 3 times a day as needed for Cough. 60 Capsule 0    finasteride (PROSCAR) 5 MG Tab Take 1 Tablet by mouth every day. 90 Tablet 3    tamsulosin (FLOMAX) 0.4 MG capsule Take 1 Capsule by mouth 1/2 hour after breakfast. 90 Capsule 3    PREVIDENT 5000 BOOSTER PLUS 1.1 % Paste       gabapentin (NEURONTIN) 300 MG Cap Take 1 Cap by mouth 2 Times a Day. (Patient taking differently: Take 300 mg by mouth 4 times a day.) 60 Cap 0    baclofen (LIORESAL) 10 MG Tab Take 1 Tab by mouth 3 times a day. (Patient taking differently: Take 10 mg by mouth 3 times a day. 1 tab twice a day) 90 Tab 0    gabapentin (NEURONTIN) 300 MG Cap Take 1 capsule orally at 8 am and 1 capsule at 2pm and 2 capsules every night at bedtime 360 Capsule 6    baclofen (LIORESAL)  "10 MG Tab Take 1 Tablet by mouth 2 times a day. 180 Tablet 6     No current facility-administered medications for this visit.       Patient Active Problem List    Diagnosis Date Noted    Skin lesion 03/25/2022    Mixed hyperlipidemia 11/23/2021    Leukopenia 05/06/2021    Chalazion of right upper eyelid 01/17/2019    Post-nasal drip 04/24/2018    Chronic right shoulder pain 04/24/2018    Abnormal white blood cell (WBC) count 09/15/2017    History of ependymoma of brain 01/06/2017    History of stroke 02/09/2015    Hemiparesis and alteration of sensations as late effects of cerebrovascular accident (HCC) 02/09/2015    Barany's nystagmus 11/14/2011    Vestibular disorder 11/14/2011    Hearing loss 08/01/2011    PFO (patent foramen ovale) 06/07/2011    Martinez's esophagus with esophagitis 02/23/2011    History of hypertension 01/20/2011    BPH (benign prostatic hypertrophy) 01/20/2011    Spondylolisthesis 01/20/2011        Objective:   Ht 1.753 m (5' 9\") Comment: pt stated  Wt 67.1 kg (148 lb) Comment: pt stated  BMI 21.86 kg/m²     Physical Exam:  Constitutional: Alert, no distress, well-groomed.  Skin: No rashes in visible areas.  Eye: Round. Conjunctiva clear, lids normal. No icterus.   ENMT: Lips pink without lesions, good dentition, moist mucous membranes. Phonation normal.  Neck: No masses, no thyromegaly. Moves freely without pain.  Respiratory: Unlabored respiratory effort, no cough or audible wheeze  Psych: Alert and oriented x3, normal affect and mood.     Assessment and Plan:   The following treatment plan was discussed:     1. Upper respiratory tract infection, unspecified type  New problem.  The patient tested positive for both COVID and flu today.  I will treat with antivirals for both.  - POCT Influenza A/B  - POCT Rapid Strep A  - POCT SARS-COV Antigen ANURAG (Symptomatic only)  - Nirmatrelvir&Ritonavir 300/100 20 x 150 MG & 10 x 100MG Tablet Therapy Pack; Take 300 mg nirmatrelvir (two 150 mg tablets) " with 100 mg ritonavir (one 100 mg tablet) by mouth, with all three tablets taken together twice daily for 5 days.  Dispense: 30 Each; Refill: 0  - oseltamivir (TAMIFLU) 75 MG Cap; Take 1 Capsule by mouth 2 times a day.  Dispense: 10 Capsule; Refill: 0  - benzonatate (TESSALON) 100 MG Cap; Take 1 Capsule by mouth 3 times a day as needed for Cough.  Dispense: 60 Capsule; Refill: 0      Follow-up: No follow-ups on file.

## 2023-01-09 ENCOUNTER — TELEPHONE (OUTPATIENT)
Dept: MEDICAL GROUP | Facility: PHYSICIAN GROUP | Age: 58
End: 2023-01-09

## 2023-01-09 ENCOUNTER — NON-PROVIDER VISIT (OUTPATIENT)
Dept: MEDICAL GROUP | Facility: PHYSICIAN GROUP | Age: 58
End: 2023-01-09
Payer: COMMERCIAL

## 2023-01-09 DIAGNOSIS — E53.8 B12 DEFICIENCY: ICD-10-CM

## 2023-01-09 PROCEDURE — 96372 THER/PROPH/DIAG INJ SC/IM: CPT | Performed by: FAMILY MEDICINE

## 2023-01-09 RX ORDER — CYANOCOBALAMIN 1000 UG/ML
1000 INJECTION, SOLUTION INTRAMUSCULAR; SUBCUTANEOUS ONCE
Status: COMPLETED | OUTPATIENT
Start: 2023-01-09 | End: 2023-01-09

## 2023-01-09 RX ADMIN — CYANOCOBALAMIN 1000 MCG: 1000 INJECTION, SOLUTION INTRAMUSCULAR; SUBCUTANEOUS at 10:33

## 2023-01-09 NOTE — PROGRESS NOTES
Raul Hinojosa is a 57 y.o. male here for a non-provider visit for B-12 injection.    Reason for injection: LOW B-12  Order in MAR?: Yes  Patient supplied?:No  Minimum interval has been met for this injection (per MAR order): Yes    Patient tolerated injection and no adverse effects were observed or reported: Yes    # of Administrations remaining in MAR: 0

## 2023-01-09 NOTE — TELEPHONE ENCOUNTER
Patient is on the MA Schedule today for B-12 vaccine/injection.    SPECIFIC Action To Be Taken: Orders pending, please sign.

## 2023-01-24 PROCEDURE — RXMED WILLOW AMBULATORY MEDICATION CHARGE: Performed by: FAMILY MEDICINE

## 2023-01-24 RX ORDER — GABAPENTIN 300 MG/1
CAPSULE ORAL
Qty: 360 CAPSULE | Refills: 6 | Status: SHIPPED | OUTPATIENT
Start: 2023-01-24

## 2023-01-24 RX ORDER — BACLOFEN 10 MG/1
10 TABLET ORAL 2 TIMES DAILY
Qty: 180 TABLET | Refills: 6 | Status: SHIPPED | OUTPATIENT
Start: 2023-01-24

## 2023-01-27 ENCOUNTER — PHARMACY VISIT (OUTPATIENT)
Dept: PHARMACY | Facility: MEDICAL CENTER | Age: 58
End: 2023-01-27
Payer: COMMERCIAL

## 2023-02-10 DIAGNOSIS — N40.0 BENIGN PROSTATIC HYPERPLASIA WITHOUT LOWER URINARY TRACT SYMPTOMS: ICD-10-CM

## 2023-02-20 DIAGNOSIS — N40.1 BENIGN PROSTATIC HYPERPLASIA WITH WEAK URINARY STREAM: ICD-10-CM

## 2023-02-20 DIAGNOSIS — R39.12 BENIGN PROSTATIC HYPERPLASIA WITH WEAK URINARY STREAM: ICD-10-CM

## 2023-02-22 RX ORDER — TAMSULOSIN HYDROCHLORIDE 0.4 MG/1
0.4 CAPSULE ORAL
Qty: 90 CAPSULE | Refills: 0 | Status: CANCELLED | OUTPATIENT
Start: 2023-02-22

## 2023-02-23 ENCOUNTER — PATIENT MESSAGE (OUTPATIENT)
Dept: MEDICAL GROUP | Facility: PHYSICIAN GROUP | Age: 58
End: 2023-02-23
Payer: COMMERCIAL

## 2023-02-23 DIAGNOSIS — N40.1 BENIGN PROSTATIC HYPERPLASIA WITH WEAK URINARY STREAM: ICD-10-CM

## 2023-02-23 DIAGNOSIS — R39.12 BENIGN PROSTATIC HYPERPLASIA WITH WEAK URINARY STREAM: ICD-10-CM

## 2023-02-23 RX ORDER — TAMSULOSIN HYDROCHLORIDE 0.4 MG/1
0.4 CAPSULE ORAL
Qty: 30 CAPSULE | Refills: 0 | Status: SHIPPED
Start: 2023-02-23 | End: 2023-02-23

## 2023-02-23 RX ORDER — TAMSULOSIN HYDROCHLORIDE 0.4 MG/1
0.4 CAPSULE ORAL
Qty: 30 CAPSULE | Refills: 0 | Status: SHIPPED | OUTPATIENT
Start: 2023-02-23 | End: 2023-03-03 | Stop reason: SDUPTHER

## 2023-02-23 RX ORDER — FINASTERIDE 5 MG/1
5 TABLET, FILM COATED ORAL DAILY
Qty: 90 TABLET | Refills: 0 | Status: SHIPPED | OUTPATIENT
Start: 2023-02-23 | End: 2023-03-03 | Stop reason: SDUPTHER

## 2023-03-02 ENCOUNTER — TELEPHONE (OUTPATIENT)
Dept: MEDICAL GROUP | Facility: PHYSICIAN GROUP | Age: 58
End: 2023-03-02
Payer: COMMERCIAL

## 2023-03-03 ENCOUNTER — PATIENT MESSAGE (OUTPATIENT)
Dept: MEDICAL GROUP | Facility: PHYSICIAN GROUP | Age: 58
End: 2023-03-03
Payer: COMMERCIAL

## 2023-03-03 DIAGNOSIS — R39.12 BENIGN PROSTATIC HYPERPLASIA WITH WEAK URINARY STREAM: ICD-10-CM

## 2023-03-03 DIAGNOSIS — N40.1 BENIGN PROSTATIC HYPERPLASIA WITH WEAK URINARY STREAM: ICD-10-CM

## 2023-03-03 RX ORDER — FINASTERIDE 5 MG/1
5 TABLET, FILM COATED ORAL DAILY
Qty: 90 TABLET | Refills: 3 | Status: SHIPPED
Start: 2023-03-03 | End: 2023-09-08

## 2023-03-03 RX ORDER — TAMSULOSIN HYDROCHLORIDE 0.4 MG/1
0.4 CAPSULE ORAL
Qty: 90 CAPSULE | Refills: 1 | Status: SHIPPED
Start: 2023-03-03 | End: 2023-09-08

## 2023-03-16 ENCOUNTER — OFFICE VISIT (OUTPATIENT)
Dept: MEDICAL GROUP | Facility: PHYSICIAN GROUP | Age: 58
End: 2023-03-16
Payer: COMMERCIAL

## 2023-03-16 VITALS
BODY MASS INDEX: 22.13 KG/M2 | HEART RATE: 97 BPM | TEMPERATURE: 97.6 F | WEIGHT: 149.4 LBS | DIASTOLIC BLOOD PRESSURE: 60 MMHG | SYSTOLIC BLOOD PRESSURE: 102 MMHG | HEIGHT: 69 IN | OXYGEN SATURATION: 93 %

## 2023-03-16 DIAGNOSIS — H55.09: ICD-10-CM

## 2023-03-16 DIAGNOSIS — E78.2 MIXED HYPERLIPIDEMIA: ICD-10-CM

## 2023-03-16 DIAGNOSIS — Z12.5 SCREENING FOR MALIGNANT NEOPLASM OF PROSTATE: ICD-10-CM

## 2023-03-16 DIAGNOSIS — Z00.00 WELL ADULT EXAM: ICD-10-CM

## 2023-03-16 PROCEDURE — 99214 OFFICE O/P EST MOD 30 MIN: CPT | Performed by: FAMILY MEDICINE

## 2023-03-16 ASSESSMENT — PATIENT HEALTH QUESTIONNAIRE - PHQ9: CLINICAL INTERPRETATION OF PHQ2 SCORE: 0

## 2023-03-16 ASSESSMENT — FIBROSIS 4 INDEX: FIB4 SCORE: 1.45

## 2023-03-16 NOTE — ASSESSMENT & PLAN NOTE
Chronic issue    Stable at this time  Has seen Dr Barajas  On Gabapentin and Baclofen  On long term disability for this.   Last certification was 8/22

## 2023-03-16 NOTE — PROGRESS NOTES
"Subjective:     Chief Complaint   Patient presents with    Follow-Up     Barany's nystagmus       HPI:   Raul presents today to discuss the following.    Barany's nystagmus  Chronic issue    Stable at this time  Has seen Dr Barajas  On Gabapentin and Baclofen  On long term disability for this.   Last certification was 8/22    Past Medical History:   Diagnosis Date    Barany's nystagmus 11/14/2011    Brain tumor (HCC) 2011    in mother (?empendymoma), also glioblastoma in maternal aunt    Hypertension     Other specified disorder of intestines     Snoring     Vestibular disorder 11/14/2011       Current Outpatient Medications Ordered in Epic   Medication Sig Dispense Refill    tamsulosin (FLOMAX) 0.4 MG capsule Take 1 Capsule by mouth 1/2 hour after breakfast. 90 Capsule 1    finasteride (PROSCAR) 5 MG Tab Take 1 Tablet by mouth every day. 90 Tablet 3    gabapentin (NEURONTIN) 300 MG Cap Take 1 capsule orally at 8 am and 1 capsule at 2pm and 2 capsules every night at bedtime 360 Capsule 6    baclofen (LIORESAL) 10 MG Tab Take 1 Tablet by mouth 2 times a day. 180 Tablet 6    PREVIDENT 5000 BOOSTER PLUS 1.1 % Paste        No current Epic-ordered facility-administered medications on file.       Allergies:  Morphine    Health Maintenance: Completed    ROS:  Gen: no fevers/chills, no changes in weight  Eyes: no changes in vision  Pulm: no sob, no cough  CV: no chest pain, no palpitations  GI: no nausea/vomiting, no diarrhea      Objective:     Exam:  /60 (BP Location: Left arm, Patient Position: Sitting, BP Cuff Size: Adult)   Pulse 97   Temp 36.4 °C (97.6 °F) (Temporal)   Ht 1.753 m (5' 9\")   Wt 67.8 kg (149 lb 6.4 oz)   SpO2 93%   BMI 22.06 kg/m²  Body mass index is 22.06 kg/m².    Gen: Alert and oriented, No apparent distress.  Eyes: PERRLA  Lungs: Normal effort, CTA bilaterally, no wheezes, rhonchi, or rales  CV: Regular rate and rhythm. No murmurs, rubs, or gallops.  Ext: No clubbing, cyanosis, " edema.  Skin: no rash, lesions or ulcers  Neuro: Moves all extremities.  Psych: AAOx3        Assessment & Plan:     58 y.o. male with the following -     1. Barany's nystagmus  Chronic, stable condition.  Continue to take baclofen and gabapentin.    2. Well adult exam  - CBC WITHOUT DIFFERENTIAL; Future  - Basic Metabolic Panel; Future  - Lipid Profile; Future  - HEMOGLOBIN A1C; Future    3. Screening for malignant neoplasm of prostate  - PROSTATE SPECIFIC AG SCREENING; Future    4. Mixed hyperlipidemia  Chronic, stable condition.  Continue with lifestyle change.  Due for repeat labs.        Return in about 6 months (around 9/16/2023).          Please note that this dictation was created using voice recognition software. I have made every reasonable attempt to correct obvious errors, but I expect that there are errors of grammar and possibly content that I did not discover before finalizing the note.

## 2023-03-28 ENCOUNTER — HOSPITAL ENCOUNTER (OUTPATIENT)
Dept: LAB | Facility: MEDICAL CENTER | Age: 58
End: 2023-03-28
Attending: FAMILY MEDICINE
Payer: COMMERCIAL

## 2023-03-28 DIAGNOSIS — Z12.5 SCREENING FOR MALIGNANT NEOPLASM OF PROSTATE: ICD-10-CM

## 2023-03-28 DIAGNOSIS — Z00.00 WELL ADULT EXAM: ICD-10-CM

## 2023-03-28 LAB
ANION GAP SERPL CALC-SCNC: 10 MMOL/L (ref 7–16)
BUN SERPL-MCNC: 16 MG/DL (ref 8–22)
CALCIUM SERPL-MCNC: 9.5 MG/DL (ref 8.5–10.5)
CHLORIDE SERPL-SCNC: 105 MMOL/L (ref 96–112)
CHOLEST SERPL-MCNC: 184 MG/DL (ref 100–199)
CO2 SERPL-SCNC: 28 MMOL/L (ref 20–33)
CREAT SERPL-MCNC: 0.82 MG/DL (ref 0.5–1.4)
ERYTHROCYTE [DISTWIDTH] IN BLOOD BY AUTOMATED COUNT: 43 FL (ref 35.9–50)
EST. AVERAGE GLUCOSE BLD GHB EST-MCNC: 120 MG/DL
FASTING STATUS PATIENT QL REPORTED: NORMAL
GFR SERPLBLD CREATININE-BSD FMLA CKD-EPI: 100 ML/MIN/1.73 M 2
GLUCOSE SERPL-MCNC: 102 MG/DL (ref 65–99)
HBA1C MFR BLD: 5.8 % (ref 4–5.6)
HCT VFR BLD AUTO: 48.4 % (ref 42–52)
HDLC SERPL-MCNC: 63 MG/DL
HGB BLD-MCNC: 15.4 G/DL (ref 14–18)
LDLC SERPL CALC-MCNC: 105 MG/DL
MCH RBC QN AUTO: 29.4 PG (ref 27–33)
MCHC RBC AUTO-ENTMCNC: 31.8 G/DL (ref 33.7–35.3)
MCV RBC AUTO: 92.4 FL (ref 81.4–97.8)
PLATELET # BLD AUTO: 211 K/UL (ref 164–446)
PMV BLD AUTO: 9.8 FL (ref 9–12.9)
POTASSIUM SERPL-SCNC: 4.8 MMOL/L (ref 3.6–5.5)
PSA SERPL-MCNC: 0.32 NG/ML (ref 0–4)
RBC # BLD AUTO: 5.24 M/UL (ref 4.7–6.1)
SODIUM SERPL-SCNC: 143 MMOL/L (ref 135–145)
TRIGL SERPL-MCNC: 78 MG/DL (ref 0–149)
WBC # BLD AUTO: 3.9 K/UL (ref 4.8–10.8)

## 2023-03-28 PROCEDURE — 85027 COMPLETE CBC AUTOMATED: CPT

## 2023-03-28 PROCEDURE — 84153 ASSAY OF PSA TOTAL: CPT

## 2023-03-28 PROCEDURE — 80061 LIPID PANEL: CPT

## 2023-03-28 PROCEDURE — 80048 BASIC METABOLIC PNL TOTAL CA: CPT

## 2023-03-28 PROCEDURE — 83036 HEMOGLOBIN GLYCOSYLATED A1C: CPT

## 2023-03-28 PROCEDURE — 36415 COLL VENOUS BLD VENIPUNCTURE: CPT

## 2023-04-05 ENCOUNTER — PATIENT MESSAGE (OUTPATIENT)
Dept: MEDICAL GROUP | Facility: PHYSICIAN GROUP | Age: 58
End: 2023-04-05
Payer: COMMERCIAL

## 2023-04-06 PROCEDURE — RXMED WILLOW AMBULATORY MEDICATION CHARGE: Performed by: FAMILY MEDICINE

## 2023-04-08 PROCEDURE — RXMED WILLOW AMBULATORY MEDICATION CHARGE: Performed by: FAMILY MEDICINE

## 2023-04-13 ENCOUNTER — PHARMACY VISIT (OUTPATIENT)
Dept: PHARMACY | Facility: MEDICAL CENTER | Age: 58
End: 2023-04-13
Payer: COMMERCIAL

## 2023-06-29 ENCOUNTER — HOSPITAL ENCOUNTER (OUTPATIENT)
Dept: LAB | Facility: MEDICAL CENTER | Age: 58
End: 2023-06-29
Attending: UROLOGY
Payer: COMMERCIAL

## 2023-06-29 LAB
ANION GAP SERPL CALC-SCNC: 10 MMOL/L (ref 7–16)
APPEARANCE UR: CLEAR
APTT PPP: 28.4 SEC (ref 24.7–36)
BASOPHILS # BLD AUTO: 0.5 % (ref 0–1.8)
BASOPHILS # BLD: 0.02 K/UL (ref 0–0.12)
BILIRUB UR QL STRIP.AUTO: NEGATIVE
BUN SERPL-MCNC: 21 MG/DL (ref 8–22)
CALCIUM SERPL-MCNC: 9.7 MG/DL (ref 8.5–10.5)
CHLORIDE SERPL-SCNC: 101 MMOL/L (ref 96–112)
CO2 SERPL-SCNC: 28 MMOL/L (ref 20–33)
COLOR UR: ABNORMAL
CREAT SERPL-MCNC: 0.74 MG/DL (ref 0.5–1.4)
EOSINOPHIL # BLD AUTO: 0.05 K/UL (ref 0–0.51)
EOSINOPHIL NFR BLD: 1.2 % (ref 0–6.9)
ERYTHROCYTE [DISTWIDTH] IN BLOOD BY AUTOMATED COUNT: 42.5 FL (ref 35.9–50)
GFR SERPLBLD CREATININE-BSD FMLA CKD-EPI: 103 ML/MIN/1.73 M 2
GLUCOSE SERPL-MCNC: 91 MG/DL (ref 65–99)
GLUCOSE UR STRIP.AUTO-MCNC: NEGATIVE MG/DL
HCT VFR BLD AUTO: 46.9 % (ref 42–52)
HGB BLD-MCNC: 14.9 G/DL (ref 14–18)
IMM GRANULOCYTES # BLD AUTO: 0.01 K/UL (ref 0–0.11)
IMM GRANULOCYTES NFR BLD AUTO: 0.2 % (ref 0–0.9)
INR PPP: 1.05 (ref 0.87–1.13)
KETONES UR STRIP.AUTO-MCNC: ABNORMAL MG/DL
LEUKOCYTE ESTERASE UR QL STRIP.AUTO: NEGATIVE
LYMPHOCYTES # BLD AUTO: 1.29 K/UL (ref 1–4.8)
LYMPHOCYTES NFR BLD: 29.7 % (ref 22–41)
MCH RBC QN AUTO: 29.2 PG (ref 27–33)
MCHC RBC AUTO-ENTMCNC: 31.8 G/DL (ref 32.3–36.5)
MCV RBC AUTO: 92 FL (ref 81.4–97.8)
MICRO URNS: ABNORMAL
MONOCYTES # BLD AUTO: 0.5 K/UL (ref 0–0.85)
MONOCYTES NFR BLD AUTO: 11.5 % (ref 0–13.4)
NEUTROPHILS # BLD AUTO: 2.47 K/UL (ref 1.82–7.42)
NEUTROPHILS NFR BLD: 56.9 % (ref 44–72)
NITRITE UR QL STRIP.AUTO: NEGATIVE
NRBC # BLD AUTO: 0 K/UL
NRBC BLD-RTO: 0 /100 WBC (ref 0–0.2)
PH UR STRIP.AUTO: 6 [PH] (ref 5–8)
PLATELET # BLD AUTO: 196 K/UL (ref 164–446)
PMV BLD AUTO: 9.7 FL (ref 9–12.9)
POTASSIUM SERPL-SCNC: 5 MMOL/L (ref 3.6–5.5)
PROT UR QL STRIP: NEGATIVE MG/DL
PROTHROMBIN TIME: 13.6 SEC (ref 12–14.6)
RBC # BLD AUTO: 5.1 M/UL (ref 4.7–6.1)
RBC UR QL AUTO: NEGATIVE
SODIUM SERPL-SCNC: 139 MMOL/L (ref 135–145)
SP GR UR STRIP.AUTO: 1.02
UROBILINOGEN UR STRIP.AUTO-MCNC: 0.2 MG/DL
WBC # BLD AUTO: 4.3 K/UL (ref 4.8–10.8)

## 2023-06-29 PROCEDURE — 80048 BASIC METABOLIC PNL TOTAL CA: CPT

## 2023-06-29 PROCEDURE — 85730 THROMBOPLASTIN TIME PARTIAL: CPT

## 2023-06-29 PROCEDURE — 81003 URINALYSIS AUTO W/O SCOPE: CPT

## 2023-06-29 PROCEDURE — 87086 URINE CULTURE/COLONY COUNT: CPT

## 2023-06-29 PROCEDURE — 36415 COLL VENOUS BLD VENIPUNCTURE: CPT

## 2023-06-29 PROCEDURE — 85610 PROTHROMBIN TIME: CPT

## 2023-06-29 PROCEDURE — 85025 COMPLETE CBC W/AUTO DIFF WBC: CPT

## 2023-07-01 LAB
BACTERIA UR CULT: NORMAL
SIGNIFICANT IND 70042: NORMAL
SITE SITE: NORMAL
SOURCE SOURCE: NORMAL

## 2023-07-31 PROCEDURE — RXMED WILLOW AMBULATORY MEDICATION CHARGE: Performed by: FAMILY MEDICINE

## 2023-08-01 ENCOUNTER — PHARMACY VISIT (OUTPATIENT)
Dept: PHARMACY | Facility: MEDICAL CENTER | Age: 58
End: 2023-08-01
Payer: COMMERCIAL

## 2023-08-02 ENCOUNTER — PHARMACY VISIT (OUTPATIENT)
Dept: PHARMACY | Facility: MEDICAL CENTER | Age: 58
End: 2023-08-02
Payer: COMMERCIAL

## 2023-08-02 PROCEDURE — RXMED WILLOW AMBULATORY MEDICATION CHARGE: Performed by: FAMILY MEDICINE

## 2023-08-11 ENCOUNTER — TELEPHONE (OUTPATIENT)
Dept: HEALTH INFORMATION MANAGEMENT | Facility: OTHER | Age: 58
End: 2023-08-11

## 2023-09-08 ENCOUNTER — OFFICE VISIT (OUTPATIENT)
Dept: MEDICAL GROUP | Facility: PHYSICIAN GROUP | Age: 58
End: 2023-09-08
Payer: COMMERCIAL

## 2023-09-08 ENCOUNTER — HOSPITAL ENCOUNTER (OUTPATIENT)
Dept: LAB | Facility: MEDICAL CENTER | Age: 58
End: 2023-09-08
Attending: FAMILY MEDICINE
Payer: COMMERCIAL

## 2023-09-08 VITALS
HEIGHT: 69 IN | OXYGEN SATURATION: 96 % | TEMPERATURE: 97.7 F | DIASTOLIC BLOOD PRESSURE: 68 MMHG | HEART RATE: 70 BPM | BODY MASS INDEX: 21.95 KG/M2 | WEIGHT: 148.2 LBS | SYSTOLIC BLOOD PRESSURE: 102 MMHG

## 2023-09-08 DIAGNOSIS — H55.09: ICD-10-CM

## 2023-09-08 DIAGNOSIS — R73.03 PREDIABETES: ICD-10-CM

## 2023-09-08 PROCEDURE — 99214 OFFICE O/P EST MOD 30 MIN: CPT | Performed by: FAMILY MEDICINE

## 2023-09-08 PROCEDURE — 83036 HEMOGLOBIN GLYCOSYLATED A1C: CPT

## 2023-09-08 PROCEDURE — 3078F DIAST BP <80 MM HG: CPT | Performed by: FAMILY MEDICINE

## 2023-09-08 PROCEDURE — 36415 COLL VENOUS BLD VENIPUNCTURE: CPT

## 2023-09-08 PROCEDURE — 3074F SYST BP LT 130 MM HG: CPT | Performed by: FAMILY MEDICINE

## 2023-09-08 ASSESSMENT — FIBROSIS 4 INDEX: FIB4 SCORE: 1.47

## 2023-09-08 NOTE — PROGRESS NOTES
"Subjective:     Chief Complaint   Patient presents with    Follow-Up       HPI:   Raul presents today to discuss the following.    Barany's nystagmus  Chronic issue  Has seen Dr Barajas  On Gabapenin and Baclofen  Doing well.   On long term disability for this      Prediabetes  Chronic issue  a1c 5.8%   Due for repeat labs    Past Medical History:   Diagnosis Date    Barany's nystagmus 11/14/2011    Brain tumor (HCC) 2011    in mother (?empendymoma), also glioblastoma in maternal aunt    Hypertension     Other specified disorder of intestines     Snoring     Vestibular disorder 11/14/2011       Current Outpatient Medications Ordered in Epic   Medication Sig Dispense Refill    gabapentin (NEURONTIN) 300 MG Cap Take 1 capsule orally at 8 am and 1 capsule at 2pm and 2 capsules every night at bedtime 360 Capsule 6    baclofen (LIORESAL) 10 MG Tab Take 1 Tablet by mouth 2 times a day. 180 Tablet 6    PREVIDENT 5000 BOOSTER PLUS 1.1 % Paste        No current Epic-ordered facility-administered medications on file.       Allergies:  Morphine    Health Maintenance: Completed    ROS:  Gen: no fevers/chills, no changes in weight  Eyes: no changes in vision  Pulm: no sob, no cough  CV: no chest pain, no palpitations  GI: no nausea/vomiting, no diarrhea      Objective:     Exam:  /68 (BP Location: Left arm, Patient Position: Sitting, BP Cuff Size: Adult)   Pulse 70   Temp 36.5 °C (97.7 °F) (Temporal)   Ht 1.753 m (5' 9\")   Wt 67.2 kg (148 lb 3.2 oz)   SpO2 96%   BMI 21.89 kg/m²  Body mass index is 21.89 kg/m².    Gen: Alert and oriented, No apparent distress.  Eyes: PERRLA  Lungs: Normal effort, CTA bilaterally, no wheezes, rhonchi, or rales  CV: Regular rate and rhythm. No murmurs, rubs, or gallops.  Ext: No clubbing, cyanosis, edema.  Skin: no rash, lesions or ulcers  Neuro: Moves all extremities.  Psych: AAOx3          Assessment & Plan:     58 y.o. male with the following -     1. Barany's nystagmus  Chronic, " stable condition.  Continue with gabapentin and baclofen.  No longer follows up with neuro-ophthalmology as his symptoms are stable.  However if they worsen or no longer respond to medication I recommend he establishes with a new neuro-ophthalmologist.  Continue with disability paperwork annually as previously filled out by neuro-ophthalmology.    2. Prediabetes  , Stable condition.  Due for repeat labs.  - HEMOGLOBIN A1C; Future      No follow-ups on file.          Please note that this dictation was created using voice recognition software. I have made every reasonable attempt to correct obvious errors, but I expect that there are errors of grammar and possibly content that I did not discover before finalizing the note.

## 2023-09-08 NOTE — ASSESSMENT & PLAN NOTE
Chronic issue  Has seen Dr Barajas  On Gabapenin and Baclofen  Doing well.   On long term disability for this

## 2023-09-09 LAB
EST. AVERAGE GLUCOSE BLD GHB EST-MCNC: 114 MG/DL
HBA1C MFR BLD: 5.6 % (ref 4–5.6)

## 2023-09-29 SDOH — ECONOMIC STABILITY: HOUSING INSECURITY: IN THE LAST 12 MONTHS, HOW MANY PLACES HAVE YOU LIVED?: 1

## 2023-09-29 SDOH — ECONOMIC STABILITY: FOOD INSECURITY: WITHIN THE PAST 12 MONTHS, THE FOOD YOU BOUGHT JUST DIDN'T LAST AND YOU DIDN'T HAVE MONEY TO GET MORE.: NEVER TRUE

## 2023-09-29 SDOH — ECONOMIC STABILITY: INCOME INSECURITY: IN THE LAST 12 MONTHS, WAS THERE A TIME WHEN YOU WERE NOT ABLE TO PAY THE MORTGAGE OR RENT ON TIME?: NO

## 2023-09-29 SDOH — ECONOMIC STABILITY: FOOD INSECURITY: WITHIN THE PAST 12 MONTHS, YOU WORRIED THAT YOUR FOOD WOULD RUN OUT BEFORE YOU GOT MONEY TO BUY MORE.: NEVER TRUE

## 2023-09-29 SDOH — HEALTH STABILITY: PHYSICAL HEALTH: ON AVERAGE, HOW MANY DAYS PER WEEK DO YOU ENGAGE IN MODERATE TO STRENUOUS EXERCISE (LIKE A BRISK WALK)?: 2 DAYS

## 2023-09-29 SDOH — ECONOMIC STABILITY: INCOME INSECURITY: HOW HARD IS IT FOR YOU TO PAY FOR THE VERY BASICS LIKE FOOD, HOUSING, MEDICAL CARE, AND HEATING?: NOT HARD AT ALL

## 2023-09-29 SDOH — HEALTH STABILITY: PHYSICAL HEALTH: ON AVERAGE, HOW MANY MINUTES DO YOU ENGAGE IN EXERCISE AT THIS LEVEL?: 10 MIN

## 2023-09-29 SDOH — HEALTH STABILITY: MENTAL HEALTH
STRESS IS WHEN SOMEONE FEELS TENSE, NERVOUS, ANXIOUS, OR CAN'T SLEEP AT NIGHT BECAUSE THEIR MIND IS TROUBLED. HOW STRESSED ARE YOU?: NOT AT ALL

## 2023-09-29 ASSESSMENT — SOCIAL DETERMINANTS OF HEALTH (SDOH)
HOW OFTEN DO YOU HAVE SIX OR MORE DRINKS ON ONE OCCASION: NEVER
HOW OFTEN DO YOU ATTENT MEETINGS OF THE CLUB OR ORGANIZATION YOU BELONG TO?: NEVER
DO YOU BELONG TO ANY CLUBS OR ORGANIZATIONS SUCH AS CHURCH GROUPS UNIONS, FRATERNAL OR ATHLETIC GROUPS, OR SCHOOL GROUPS?: NO
HOW MANY DRINKS CONTAINING ALCOHOL DO YOU HAVE ON A TYPICAL DAY WHEN YOU ARE DRINKING: 1 OR 2
HOW OFTEN DO YOU GET TOGETHER WITH FRIENDS OR RELATIVES?: ONCE A WEEK
HOW OFTEN DO YOU ATTEND CHURCH OR RELIGIOUS SERVICES?: MORE THAN 4 TIMES PER YEAR
DO YOU BELONG TO ANY CLUBS OR ORGANIZATIONS SUCH AS CHURCH GROUPS UNIONS, FRATERNAL OR ATHLETIC GROUPS, OR SCHOOL GROUPS?: NO
WITHIN THE PAST 12 MONTHS, YOU WORRIED THAT YOUR FOOD WOULD RUN OUT BEFORE YOU GOT THE MONEY TO BUY MORE: NEVER TRUE
IN A TYPICAL WEEK, HOW MANY TIMES DO YOU TALK ON THE PHONE WITH FAMILY, FRIENDS, OR NEIGHBORS?: THREE TIMES A WEEK
HOW OFTEN DO YOU ATTEND CHURCH OR RELIGIOUS SERVICES?: MORE THAN 4 TIMES PER YEAR
IN A TYPICAL WEEK, HOW MANY TIMES DO YOU TALK ON THE PHONE WITH FAMILY, FRIENDS, OR NEIGHBORS?: THREE TIMES A WEEK
HOW HARD IS IT FOR YOU TO PAY FOR THE VERY BASICS LIKE FOOD, HOUSING, MEDICAL CARE, AND HEATING?: NOT HARD AT ALL
HOW OFTEN DO YOU HAVE A DRINK CONTAINING ALCOHOL: 2-4 TIMES A MONTH
HOW OFTEN DO YOU ATTENT MEETINGS OF THE CLUB OR ORGANIZATION YOU BELONG TO?: NEVER
HOW OFTEN DO YOU GET TOGETHER WITH FRIENDS OR RELATIVES?: ONCE A WEEK

## 2023-09-29 ASSESSMENT — LIFESTYLE VARIABLES
AUDIT-C TOTAL SCORE: 2
SKIP TO QUESTIONS 9-10: 1
HOW OFTEN DO YOU HAVE A DRINK CONTAINING ALCOHOL: 2-4 TIMES A MONTH
HOW MANY STANDARD DRINKS CONTAINING ALCOHOL DO YOU HAVE ON A TYPICAL DAY: 1 OR 2
HOW OFTEN DO YOU HAVE SIX OR MORE DRINKS ON ONE OCCASION: NEVER

## 2023-10-03 ENCOUNTER — APPOINTMENT (OUTPATIENT)
Dept: MEDICAL GROUP | Facility: MEDICAL CENTER | Age: 58
End: 2023-10-03
Payer: COMMERCIAL

## 2023-10-04 ENCOUNTER — NON-PROVIDER VISIT (OUTPATIENT)
Dept: MEDICAL GROUP | Facility: PHYSICIAN GROUP | Age: 58
End: 2023-10-04
Payer: COMMERCIAL

## 2023-10-04 ENCOUNTER — TELEPHONE (OUTPATIENT)
Dept: MEDICAL GROUP | Facility: PHYSICIAN GROUP | Age: 58
End: 2023-10-04

## 2023-10-04 DIAGNOSIS — Z23 NEED FOR VACCINATION: ICD-10-CM

## 2023-10-04 PROCEDURE — 90686 IIV4 VACC NO PRSV 0.5 ML IM: CPT | Performed by: FAMILY MEDICINE

## 2023-10-04 PROCEDURE — 90471 IMMUNIZATION ADMIN: CPT | Performed by: FAMILY MEDICINE

## 2023-10-04 RX ORDER — AZITHROMYCIN 500 MG/1
500 TABLET, FILM COATED ORAL DAILY
Qty: 5 TABLET | Refills: 0 | Status: SHIPPED | OUTPATIENT
Start: 2023-10-04 | End: 2023-10-09

## 2023-10-04 NOTE — PROGRESS NOTES
"Raul Hinojosa is a 58 y.o. male here for a non-provider visit for:   FLU    Reason for immunization: Annual Flu Vaccine  Immunization records indicate need for vaccine: Yes, confirmed with Epic  Minimum interval has been met for this vaccine: Yes  ABN completed: Yes    VIS Dated  8/6/21 was given to patient: Yes  All IAC Questionnaire questions were answered \"No.\"    Patient tolerated injection and no adverse effects were observed or reported: Yes    Pt scheduled for next dose in series: No  "

## 2023-10-10 ENCOUNTER — PHARMACY VISIT (OUTPATIENT)
Dept: PHARMACY | Facility: MEDICAL CENTER | Age: 58
End: 2023-10-10
Payer: COMMERCIAL

## 2023-10-10 PROCEDURE — RXMED WILLOW AMBULATORY MEDICATION CHARGE: Performed by: DENTIST

## 2023-10-10 PROCEDURE — RXMED WILLOW AMBULATORY MEDICATION CHARGE: Performed by: FAMILY MEDICINE

## 2023-10-12 ENCOUNTER — PHARMACY VISIT (OUTPATIENT)
Dept: PHARMACY | Facility: MEDICAL CENTER | Age: 58
End: 2023-10-12

## 2023-11-09 ENCOUNTER — OFFICE VISIT (OUTPATIENT)
Dept: MEDICAL GROUP | Facility: MEDICAL CENTER | Age: 58
End: 2023-11-09
Payer: COMMERCIAL

## 2023-11-09 VITALS
DIASTOLIC BLOOD PRESSURE: 60 MMHG | OXYGEN SATURATION: 98 % | WEIGHT: 154.6 LBS | SYSTOLIC BLOOD PRESSURE: 112 MMHG | HEIGHT: 68 IN | RESPIRATION RATE: 18 BRPM | HEART RATE: 85 BPM | TEMPERATURE: 98 F | BODY MASS INDEX: 23.43 KG/M2

## 2023-11-09 DIAGNOSIS — H55.09: ICD-10-CM

## 2023-11-09 DIAGNOSIS — E78.2 MIXED HYPERLIPIDEMIA: ICD-10-CM

## 2023-11-09 DIAGNOSIS — Z12.5 PROSTATE CANCER SCREENING: ICD-10-CM

## 2023-11-09 DIAGNOSIS — Z11.59 NEED FOR HEPATITIS C SCREENING TEST: ICD-10-CM

## 2023-11-09 DIAGNOSIS — Z13.21 ENCOUNTER FOR VITAMIN DEFICIENCY SCREENING: ICD-10-CM

## 2023-11-09 DIAGNOSIS — Z02.89 ENCOUNTER FOR COMPLETION OF FORM WITH PATIENT: ICD-10-CM

## 2023-11-09 DIAGNOSIS — I69.398 HEMIPARESIS AND ALTERATION OF SENSATIONS AS LATE EFFECTS OF CEREBROVASCULAR ACCIDENT (HCC): ICD-10-CM

## 2023-11-09 DIAGNOSIS — I69.359 HEMIPARESIS AND ALTERATION OF SENSATIONS AS LATE EFFECTS OF CEREBROVASCULAR ACCIDENT (HCC): ICD-10-CM

## 2023-11-09 DIAGNOSIS — R73.03 PREDIABETES: ICD-10-CM

## 2023-11-09 DIAGNOSIS — Z13.6 SCREENING FOR CARDIOVASCULAR CONDITION: ICD-10-CM

## 2023-11-09 DIAGNOSIS — D70.8 OTHER NEUTROPENIA (HCC): ICD-10-CM

## 2023-11-09 DIAGNOSIS — H81.90 DISORDER OF VESTIBULAR FUNCTION, UNSPECIFIED LATERALITY: ICD-10-CM

## 2023-11-09 PROBLEM — D72.9 ABNORMAL WHITE BLOOD CELL (WBC) COUNT: Status: RESOLVED | Noted: 2017-09-15 | Resolved: 2023-11-09

## 2023-11-09 PROCEDURE — 99214 OFFICE O/P EST MOD 30 MIN: CPT | Performed by: FAMILY MEDICINE

## 2023-11-09 PROCEDURE — 3078F DIAST BP <80 MM HG: CPT | Performed by: FAMILY MEDICINE

## 2023-11-09 PROCEDURE — 3074F SYST BP LT 130 MM HG: CPT | Performed by: FAMILY MEDICINE

## 2023-11-09 NOTE — PROGRESS NOTES
Subjective:     CC:  Diagnoses of Prediabetes, Mixed hyperlipidemia, Other neutropenia (HCC), Screening for cardiovascular condition, Need for hepatitis C screening test, Encounter for vitamin deficiency screening, Prostate cancer screening, Barany's nystagmus, Disorder of vestibular function, unspecified laterality, Hemiparesis and alteration of sensations as late effects of cerebrovascular accident (HCC), and Encounter for completion of form with patient were pertinent to this visit.    HISTORY OF THE PRESENT ILLNESS: Patient is a 58 y.o. male. This pleasant patient is here today to establish care and discuss disability form. His/her prior PCP was Dr. Yusuf.    Had been seeing Dr. Chapman a neuroophthalmologist, patient is s/p brain tumor removal 2011, he subsequently had multiple strokes leading to him being on disability. He reports mornings are better and he worsens as the day goes on. He has Barany's nystagmus and ocular-vestibular disturbance. Notes he can get cognitive issue, fogginess. He gets jaw pain. He also gets positional right arm numbness. Tells me he was also having emotional outburst when he was working. All of this has lead him to being on disability indefinitely. He did attempt PT/OT and it did help him get stronger but no improvement in symptoms.    Problem   Abnormal White Blood Cell (Wbc) Count (Resolved)       Current Outpatient Medications Ordered in Epic   Medication Sig Dispense Refill    Sodium Fluoride 1.1 % Paste Use twice daily for sensitivity. Spit product out. DO NOT RINSE 113 g 4    gabapentin (NEURONTIN) 300 MG Cap Take 1 capsule orally at 8 am and 1 capsule at 2pm and 2 capsules every night at bedtime 360 Capsule 6    baclofen (LIORESAL) 10 MG Tab Take 1 Tablet by mouth 2 times a day. 180 Tablet 6    PREVIDENT 5000 BOOSTER PLUS 1.1 % Paste        No current Epic-ordered facility-administered medications on file.       Health Maintenance: completed    ROS:   ROS see HPI     "  Objective:     Exam: /60   Pulse 85   Temp 36.7 °C (98 °F)   Resp 18   Ht 1.727 m (5' 8\")   Wt 70.1 kg (154 lb 9.6 oz)   SpO2 98%  Body mass index is 23.51 kg/m².    Physical Exam  Vitals reviewed.   Constitutional:       General: He is not in acute distress.     Appearance: Normal appearance.   HENT:      Head: Normocephalic and atraumatic.   Cardiovascular:      Rate and Rhythm: Normal rate and regular rhythm.      Heart sounds: Normal heart sounds.   Pulmonary:      Effort: Pulmonary effort is normal. No respiratory distress.      Breath sounds: Normal breath sounds.   Skin:     General: Skin is warm and dry.   Neurological:      Mental Status: He is alert. Mental status is at baseline.      Gait: Gait normal.   Psychiatric:         Mood and Affect: Mood normal.         Behavior: Behavior normal.             Assessment & Plan:   58 y.o. male with the following -    1. Prediabetes  Comp Metabolic Panel    TSH WITH REFLEX TO FT4    HEMOGLOBIN A1C      2. Mixed hyperlipidemia  Comp Metabolic Panel    Lipid Profile      3. Other neutropenia (HCC)  CBC WITHOUT DIFFERENTIAL    TSH WITH REFLEX TO FT4      4. Screening for cardiovascular condition  Comp Metabolic Panel    Lipid Profile    TSH WITH REFLEX TO FT4      5. Need for hepatitis C screening test  HEP C VIRUS ANTIBODY      6. Encounter for vitamin deficiency screening  VITAMIN D,25 HYDROXY (DEFICIENCY)    VITAMIN B12      7. Prostate cancer screening  PROSTATE SPECIFIC AG SCREENING      8. Barany's nystagmus        9. Disorder of vestibular function, unspecified laterality        10. Hemiparesis and alteration of sensations as late effects of cerebrovascular accident (HCC)        11. Encounter for completion of form with patient          Disability form filled out and placed in chart for chronic neurologic and cognitive sequelae of strokes suffered after neurosurgery to remove brain tumor.      Return in about 9 months (around 8/9/2024) for Annual " Visit.    Please note that this dictation was created using voice recognition software. I have made every reasonable attempt to correct obvious errors, but I expect that there are errors of grammar and possibly content that I did not discover before finalizing the note.

## 2024-01-02 ENCOUNTER — OFFICE VISIT (OUTPATIENT)
Dept: DERMATOLOGY | Facility: IMAGING CENTER | Age: 59
End: 2024-01-02
Payer: COMMERCIAL

## 2024-01-02 DIAGNOSIS — D22.9 MULTIPLE NEVI: ICD-10-CM

## 2024-01-02 DIAGNOSIS — L81.4 LENTIGINES: ICD-10-CM

## 2024-01-02 DIAGNOSIS — L82.1 SK (SEBORRHEIC KERATOSIS): ICD-10-CM

## 2024-01-02 DIAGNOSIS — D18.01 CHERRY ANGIOMA: ICD-10-CM

## 2024-01-02 DIAGNOSIS — Z12.83 SKIN CANCER SCREENING: ICD-10-CM

## 2024-01-02 PROCEDURE — 99213 OFFICE O/P EST LOW 20 MIN: CPT | Performed by: NURSE PRACTITIONER

## 2024-01-02 NOTE — PROGRESS NOTES
DERMATOLOGY NOTE  NEW VISIT       Chief complaint: Establish Care (Upper body JACKSON)     HPI/location: lt arm red lesion   Time present: +1 yr   Painful lesion: No  Itching lesion: Yes  Enlarging lesion: No  Anything make it better or worse? Shirt rubbing it     History of skin cancer: No  History of precancers/actinic keratoses: No  History of biopsies:No  History of blistering/severe sunburns:Yes, Details: multiple   Family history of skin cancer:No  Family history of atypical moles:No    Allergies   Allergen Reactions    Morphine Vomiting     MEDICATIONS:  Medications relevant to specialty reviewed.     REVIEW OF SYSTEMS:   Positive for skin (see HPI)  Negative for fevers and chills       EXAM:  There were no vitals taken for this visit.  Constitutional: Well-developed, well-nourished, and in no distress.     A focused skin exam was performed including the affected areas of the upper body--head, face, neck, trunk and upper extremities. Notable findings on exam today listed below and/or in assessment/plan.     -sun exposed skin of trunk and b/l upper extremities and face with scattered clinically benign light brown reticulated macules all of which were morphologically similar and none of which were suspicious for skin cancer today on exam  Several scattered 1-3mm bright red macules and thin papules on the scalp, trunk, and  upper extremities  Several tan skin-colored stuck-on waxy papules scattered on the face, trunk, and upper extremities  Multiple light brown medium brown skin-colored macules papules scattered over the trunk, face and upper extremities--All with benign-appearing pigment network patterns on dermoscopy      IMPRESSION / PLAN:    1. Lentigines  - Benign-appearing nature of lesions discussed during exam.   - Advised to continue to monitor for any return to clinic for new or concerning changes.      2. Cherry angioma  - Benign-appearing nature of lesions discussed during exam.   - Advised to continue  to monitor for any return to clinic for new or concerning changes.      3. SK (seborrheic keratosis)  - Benign-appearing nature of lesions discussed during exam.   - Advised to continue to monitor for any return to clinic for new or concerning changes.      4. Multiple nevi  - Benign-appearing nature of lesions discussed during exam.   - Advised to continue to monitor for any return to clinic for new or concerning changes.  - ABCDE's of melanoma discussed/handout given      5. Skin cancer screening  Skin cancer education  discussed importance of sun protective clothing, eyewear in addition to the use of broad spectrum sunscreen with SPF 30 or greater, as well as need for reapplication ~every 2 hours when exposed to UVR/handout given  discussed importance following up for any new or changing lesions as noted in handout given, but every 12 months exams in clinic in the setting of dermatologic history  ABCDE's of melanoma discussed/handout given        Please note that this dictation was created using voice recognition software. I have made every reasonable attempt to correct obvious errors, but I expect that there are errors of grammar and possibly content that I did not discover before finalizing the note.      Return to clinic in: Return in about 1 year (around 1/2/2025) for JACKSON. and as needed for any new or changing skin lesions.

## 2024-01-19 PROCEDURE — RXMED WILLOW AMBULATORY MEDICATION CHARGE: Performed by: FAMILY MEDICINE

## 2024-01-22 ENCOUNTER — PHARMACY VISIT (OUTPATIENT)
Dept: PHARMACY | Facility: MEDICAL CENTER | Age: 59
End: 2024-01-22
Payer: COMMERCIAL

## 2024-02-13 PROCEDURE — RXMED WILLOW AMBULATORY MEDICATION CHARGE: Performed by: DENTIST

## 2024-02-13 PROCEDURE — RXMED WILLOW AMBULATORY MEDICATION CHARGE: Performed by: NURSE PRACTITIONER

## 2024-02-16 ENCOUNTER — PHARMACY VISIT (OUTPATIENT)
Dept: PHARMACY | Facility: MEDICAL CENTER | Age: 59
End: 2024-02-16
Payer: COMMERCIAL

## 2024-02-18 ENCOUNTER — HOSPITAL ENCOUNTER (OUTPATIENT)
Dept: RADIOLOGY | Facility: MEDICAL CENTER | Age: 59
End: 2024-02-18
Attending: NEUROLOGICAL SURGERY
Payer: COMMERCIAL

## 2024-02-18 DIAGNOSIS — C71.9 MALIGNANT NEOPLASM OF BRAIN, UNSPECIFIED LOCATION (HCC): ICD-10-CM

## 2024-02-18 PROCEDURE — A9579 GAD-BASE MR CONTRAST NOS,1ML: HCPCS | Performed by: NEUROLOGICAL SURGERY

## 2024-02-18 PROCEDURE — 70553 MRI BRAIN STEM W/O & W/DYE: CPT

## 2024-02-18 PROCEDURE — 700117 HCHG RX CONTRAST REV CODE 255: Performed by: NEUROLOGICAL SURGERY

## 2024-02-18 PROCEDURE — 72156 MRI NECK SPINE W/O & W/DYE: CPT

## 2024-02-18 RX ADMIN — GADOTERIDOL 14 ML: 279.3 INJECTION, SOLUTION INTRAVENOUS at 11:45

## 2024-02-29 ENCOUNTER — HOSPITAL ENCOUNTER (OUTPATIENT)
Dept: LAB | Facility: MEDICAL CENTER | Age: 59
End: 2024-02-29
Attending: STUDENT IN AN ORGANIZED HEALTH CARE EDUCATION/TRAINING PROGRAM
Payer: COMMERCIAL

## 2024-02-29 LAB — PSA SERPL-MCNC: 0.67 NG/ML (ref 0–4)

## 2024-02-29 PROCEDURE — 84153 ASSAY OF PSA TOTAL: CPT

## 2024-02-29 PROCEDURE — 36415 COLL VENOUS BLD VENIPUNCTURE: CPT

## 2024-04-09 PROCEDURE — RXMED WILLOW AMBULATORY MEDICATION CHARGE: Performed by: FAMILY MEDICINE

## 2024-04-09 RX ORDER — GABAPENTIN 300 MG/1
CAPSULE ORAL
Qty: 360 CAPSULE | Refills: 6 | Status: SHIPPED | OUTPATIENT
Start: 2024-04-09

## 2024-04-09 RX ORDER — BACLOFEN 10 MG/1
10 TABLET ORAL 2 TIMES DAILY
Qty: 180 TABLET | Refills: 6 | Status: SHIPPED | OUTPATIENT
Start: 2024-04-09

## 2024-04-09 NOTE — TELEPHONE ENCOUNTER
Received request via: Pharmacy    Was the patient seen in the last year in this department? Yes    Does the patient have an active prescription (recently filled or refills available) for medication(s) requested? No    Pharmacy Name: [facility]     Does the patient have alf Plus and need 100 day supply (blood pressure, diabetes and cholesterol meds only)? Yes, quantity updated to 100 days

## 2024-04-10 ENCOUNTER — PHARMACY VISIT (OUTPATIENT)
Dept: PHARMACY | Facility: MEDICAL CENTER | Age: 59
End: 2024-04-10
Payer: COMMERCIAL

## 2024-07-15 PROCEDURE — RXMED WILLOW AMBULATORY MEDICATION CHARGE: Performed by: FAMILY MEDICINE

## 2024-07-17 ENCOUNTER — PHARMACY VISIT (OUTPATIENT)
Dept: PHARMACY | Facility: MEDICAL CENTER | Age: 59
End: 2024-07-17
Payer: COMMERCIAL

## 2024-08-13 ENCOUNTER — TELEPHONE (OUTPATIENT)
Dept: MEDICAL GROUP | Facility: MEDICAL CENTER | Age: 59
End: 2024-08-13
Payer: COMMERCIAL

## 2024-08-13 DIAGNOSIS — D72.819 LEUKOPENIA, UNSPECIFIED TYPE: ICD-10-CM

## 2024-09-30 ENCOUNTER — HOSPITAL ENCOUNTER (OUTPATIENT)
Dept: LAB | Facility: MEDICAL CENTER | Age: 59
End: 2024-09-30
Attending: FAMILY MEDICINE
Payer: COMMERCIAL

## 2024-09-30 DIAGNOSIS — R73.03 PREDIABETES: ICD-10-CM

## 2024-09-30 DIAGNOSIS — Z13.6 SCREENING FOR CARDIOVASCULAR CONDITION: ICD-10-CM

## 2024-09-30 DIAGNOSIS — D70.8 OTHER NEUTROPENIA (HCC): ICD-10-CM

## 2024-09-30 DIAGNOSIS — Z13.21 ENCOUNTER FOR VITAMIN DEFICIENCY SCREENING: ICD-10-CM

## 2024-09-30 DIAGNOSIS — Z11.59 NEED FOR HEPATITIS C SCREENING TEST: ICD-10-CM

## 2024-09-30 DIAGNOSIS — D72.819 LEUKOPENIA, UNSPECIFIED TYPE: ICD-10-CM

## 2024-09-30 DIAGNOSIS — E78.2 MIXED HYPERLIPIDEMIA: ICD-10-CM

## 2024-09-30 LAB
25(OH)D3 SERPL-MCNC: 57 NG/ML (ref 30–100)
ALBUMIN SERPL BCP-MCNC: 4.5 G/DL (ref 3.2–4.9)
ALBUMIN/GLOB SERPL: 1.4 G/DL
ALP SERPL-CCNC: 89 U/L (ref 30–99)
ALT SERPL-CCNC: 19 U/L (ref 2–50)
ANION GAP SERPL CALC-SCNC: 13 MMOL/L (ref 7–16)
AST SERPL-CCNC: 29 U/L (ref 12–45)
BASOPHILS # BLD AUTO: 0.7 % (ref 0–1.8)
BASOPHILS # BLD: 0.03 K/UL (ref 0–0.12)
BILIRUB SERPL-MCNC: 0.7 MG/DL (ref 0.1–1.5)
BUN SERPL-MCNC: 15 MG/DL (ref 8–22)
CALCIUM ALBUM COR SERPL-MCNC: 9.7 MG/DL (ref 8.5–10.5)
CALCIUM SERPL-MCNC: 10.1 MG/DL (ref 8.5–10.5)
CHLORIDE SERPL-SCNC: 103 MMOL/L (ref 96–112)
CHOLEST SERPL-MCNC: 191 MG/DL (ref 100–199)
CO2 SERPL-SCNC: 26 MMOL/L (ref 20–33)
CREAT SERPL-MCNC: 0.73 MG/DL (ref 0.5–1.4)
EOSINOPHIL # BLD AUTO: 0.06 K/UL (ref 0–0.51)
EOSINOPHIL NFR BLD: 1.3 % (ref 0–6.9)
ERYTHROCYTE [DISTWIDTH] IN BLOOD BY AUTOMATED COUNT: 41.9 FL (ref 35.9–50)
EST. AVERAGE GLUCOSE BLD GHB EST-MCNC: 123 MG/DL
FASTING STATUS PATIENT QL REPORTED: NORMAL
GFR SERPLBLD CREATININE-BSD FMLA CKD-EPI: 103 ML/MIN/1.73 M 2
GLOBULIN SER CALC-MCNC: 3.2 G/DL (ref 1.9–3.5)
GLUCOSE SERPL-MCNC: 91 MG/DL (ref 65–99)
HBA1C MFR BLD: 5.9 % (ref 4–5.6)
HCT VFR BLD AUTO: 48.2 % (ref 42–52)
HCV AB SER QL: NORMAL
HDLC SERPL-MCNC: 60 MG/DL
HGB BLD-MCNC: 15.7 G/DL (ref 14–18)
IMM GRANULOCYTES # BLD AUTO: 0.01 K/UL (ref 0–0.11)
IMM GRANULOCYTES NFR BLD AUTO: 0.2 % (ref 0–0.9)
LDLC SERPL CALC-MCNC: 115 MG/DL
LYMPHOCYTES # BLD AUTO: 1.78 K/UL (ref 1–4.8)
LYMPHOCYTES NFR BLD: 39.3 % (ref 22–41)
MCH RBC QN AUTO: 30 PG (ref 27–33)
MCHC RBC AUTO-ENTMCNC: 32.6 G/DL (ref 32.3–36.5)
MCV RBC AUTO: 92 FL (ref 81.4–97.8)
MONOCYTES # BLD AUTO: 0.46 K/UL (ref 0–0.85)
MONOCYTES NFR BLD AUTO: 10.2 % (ref 0–13.4)
NEUTROPHILS # BLD AUTO: 2.19 K/UL (ref 1.82–7.42)
NEUTROPHILS NFR BLD: 48.3 % (ref 44–72)
NRBC # BLD AUTO: 0 K/UL
NRBC BLD-RTO: 0 /100 WBC (ref 0–0.2)
PLATELET # BLD AUTO: 272 K/UL (ref 164–446)
PMV BLD AUTO: 9.6 FL (ref 9–12.9)
POTASSIUM SERPL-SCNC: 4.8 MMOL/L (ref 3.6–5.5)
PROT SERPL-MCNC: 7.7 G/DL (ref 6–8.2)
RBC # BLD AUTO: 5.24 M/UL (ref 4.7–6.1)
SODIUM SERPL-SCNC: 142 MMOL/L (ref 135–145)
TRIGL SERPL-MCNC: 78 MG/DL (ref 0–149)
TSH SERPL DL<=0.005 MIU/L-ACNC: 2.33 UIU/ML (ref 0.38–5.33)
VIT B12 SERPL-MCNC: 588 PG/ML (ref 211–911)
WBC # BLD AUTO: 4.5 K/UL (ref 4.8–10.8)

## 2024-09-30 PROCEDURE — 82306 VITAMIN D 25 HYDROXY: CPT

## 2024-09-30 PROCEDURE — 36415 COLL VENOUS BLD VENIPUNCTURE: CPT

## 2024-09-30 PROCEDURE — 82607 VITAMIN B-12: CPT

## 2024-09-30 PROCEDURE — 86803 HEPATITIS C AB TEST: CPT

## 2024-09-30 PROCEDURE — 84443 ASSAY THYROID STIM HORMONE: CPT

## 2024-09-30 PROCEDURE — 80053 COMPREHEN METABOLIC PANEL: CPT

## 2024-09-30 PROCEDURE — 80061 LIPID PANEL: CPT

## 2024-09-30 PROCEDURE — 85025 COMPLETE CBC W/AUTO DIFF WBC: CPT

## 2024-09-30 PROCEDURE — 83036 HEMOGLOBIN GLYCOSYLATED A1C: CPT

## 2024-10-01 RX ORDER — TAMSULOSIN HYDROCHLORIDE 0.4 MG/1
CAPSULE ORAL
COMMUNITY
End: 2024-10-07

## 2024-10-04 SDOH — ECONOMIC STABILITY: FOOD INSECURITY: WITHIN THE PAST 12 MONTHS, THE FOOD YOU BOUGHT JUST DIDN'T LAST AND YOU DIDN'T HAVE MONEY TO GET MORE.: NEVER TRUE

## 2024-10-04 SDOH — HEALTH STABILITY: PHYSICAL HEALTH: ON AVERAGE, HOW MANY MINUTES DO YOU ENGAGE IN EXERCISE AT THIS LEVEL?: 10 MIN

## 2024-10-04 SDOH — HEALTH STABILITY: PHYSICAL HEALTH: ON AVERAGE, HOW MANY DAYS PER WEEK DO YOU ENGAGE IN MODERATE TO STRENUOUS EXERCISE (LIKE A BRISK WALK)?: 3 DAYS

## 2024-10-04 SDOH — ECONOMIC STABILITY: FOOD INSECURITY: WITHIN THE PAST 12 MONTHS, YOU WORRIED THAT YOUR FOOD WOULD RUN OUT BEFORE YOU GOT MONEY TO BUY MORE.: NEVER TRUE

## 2024-10-04 SDOH — ECONOMIC STABILITY: INCOME INSECURITY: IN THE LAST 12 MONTHS, WAS THERE A TIME WHEN YOU WERE NOT ABLE TO PAY THE MORTGAGE OR RENT ON TIME?: NO

## 2024-10-04 SDOH — ECONOMIC STABILITY: INCOME INSECURITY: HOW HARD IS IT FOR YOU TO PAY FOR THE VERY BASICS LIKE FOOD, HOUSING, MEDICAL CARE, AND HEATING?: NOT HARD AT ALL

## 2024-10-04 ASSESSMENT — SOCIAL DETERMINANTS OF HEALTH (SDOH)
HOW OFTEN DO YOU ATTENT MEETINGS OF THE CLUB OR ORGANIZATION YOU BELONG TO?: NEVER
IN A TYPICAL WEEK, HOW MANY TIMES DO YOU TALK ON THE PHONE WITH FAMILY, FRIENDS, OR NEIGHBORS?: MORE THAN THREE TIMES A WEEK
DO YOU BELONG TO ANY CLUBS OR ORGANIZATIONS SUCH AS CHURCH GROUPS UNIONS, FRATERNAL OR ATHLETIC GROUPS, OR SCHOOL GROUPS?: NO
IN A TYPICAL WEEK, HOW MANY TIMES DO YOU TALK ON THE PHONE WITH FAMILY, FRIENDS, OR NEIGHBORS?: MORE THAN THREE TIMES A WEEK
HOW OFTEN DO YOU ATTENT MEETINGS OF THE CLUB OR ORGANIZATION YOU BELONG TO?: NEVER
IN THE PAST 12 MONTHS, HAS THE ELECTRIC, GAS, OIL, OR WATER COMPANY THREATENED TO SHUT OFF SERVICE IN YOUR HOME?: NO
HOW OFTEN DO YOU ATTEND CHURCH OR RELIGIOUS SERVICES?: MORE THAN 4 TIMES PER YEAR
WITHIN THE PAST 12 MONTHS, YOU WORRIED THAT YOUR FOOD WOULD RUN OUT BEFORE YOU GOT THE MONEY TO BUY MORE: NEVER TRUE
HOW OFTEN DO YOU GET TOGETHER WITH FRIENDS OR RELATIVES?: ONCE A WEEK
HOW OFTEN DO YOU ATTEND CHURCH OR RELIGIOUS SERVICES?: MORE THAN 4 TIMES PER YEAR
HOW MANY DRINKS CONTAINING ALCOHOL DO YOU HAVE ON A TYPICAL DAY WHEN YOU ARE DRINKING: 1 OR 2
DO YOU BELONG TO ANY CLUBS OR ORGANIZATIONS SUCH AS CHURCH GROUPS UNIONS, FRATERNAL OR ATHLETIC GROUPS, OR SCHOOL GROUPS?: NO
HOW OFTEN DO YOU GET TOGETHER WITH FRIENDS OR RELATIVES?: ONCE A WEEK
HOW OFTEN DO YOU HAVE SIX OR MORE DRINKS ON ONE OCCASION: NEVER
HOW HARD IS IT FOR YOU TO PAY FOR THE VERY BASICS LIKE FOOD, HOUSING, MEDICAL CARE, AND HEATING?: NOT HARD AT ALL
HOW OFTEN DO YOU HAVE A DRINK CONTAINING ALCOHOL: 2-4 TIMES A MONTH

## 2024-10-04 ASSESSMENT — LIFESTYLE VARIABLES
SKIP TO QUESTIONS 9-10: 1
HOW OFTEN DO YOU HAVE SIX OR MORE DRINKS ON ONE OCCASION: NEVER
HOW OFTEN DO YOU HAVE A DRINK CONTAINING ALCOHOL: 2-4 TIMES A MONTH
HOW MANY STANDARD DRINKS CONTAINING ALCOHOL DO YOU HAVE ON A TYPICAL DAY: 1 OR 2
AUDIT-C TOTAL SCORE: 2

## 2024-10-07 ENCOUNTER — APPOINTMENT (OUTPATIENT)
Dept: MEDICAL GROUP | Facility: MEDICAL CENTER | Age: 59
End: 2024-10-07
Payer: COMMERCIAL

## 2024-10-07 VITALS
TEMPERATURE: 97.4 F | DIASTOLIC BLOOD PRESSURE: 60 MMHG | SYSTOLIC BLOOD PRESSURE: 110 MMHG | HEIGHT: 68 IN | BODY MASS INDEX: 22.58 KG/M2 | WEIGHT: 149 LBS | HEART RATE: 72 BPM | OXYGEN SATURATION: 96 %

## 2024-10-07 DIAGNOSIS — D72.819 LEUKOPENIA, UNSPECIFIED TYPE: ICD-10-CM

## 2024-10-07 DIAGNOSIS — N40.0 BENIGN PROSTATIC HYPERPLASIA WITHOUT LOWER URINARY TRACT SYMPTOMS: Chronic | ICD-10-CM

## 2024-10-07 DIAGNOSIS — Z00.00 WELLNESS EXAMINATION: ICD-10-CM

## 2024-10-07 DIAGNOSIS — Z12.11 COLON CANCER SCREENING: ICD-10-CM

## 2024-10-07 DIAGNOSIS — Z86.16 HISTORY OF COVID-19: ICD-10-CM

## 2024-10-07 DIAGNOSIS — Z12.5 PROSTATE CANCER SCREENING: ICD-10-CM

## 2024-10-07 DIAGNOSIS — E78.2 MIXED HYPERLIPIDEMIA: ICD-10-CM

## 2024-10-07 DIAGNOSIS — R73.03 PREDIABETES: ICD-10-CM

## 2024-10-07 PROBLEM — F40.240 CLAUSTROPHOBIA: Status: RESOLVED | Noted: 2024-02-12 | Resolved: 2024-10-07

## 2024-10-07 PROCEDURE — 3074F SYST BP LT 130 MM HG: CPT | Performed by: FAMILY MEDICINE

## 2024-10-07 PROCEDURE — 3078F DIAST BP <80 MM HG: CPT | Performed by: FAMILY MEDICINE

## 2024-10-07 PROCEDURE — 99396 PREV VISIT EST AGE 40-64: CPT | Performed by: FAMILY MEDICINE

## 2024-10-07 ASSESSMENT — FIBROSIS 4 INDEX: FIB4 SCORE: 1.49

## 2024-10-07 ASSESSMENT — PATIENT HEALTH QUESTIONNAIRE - PHQ9: CLINICAL INTERPRETATION OF PHQ2 SCORE: 0

## 2024-10-14 ENCOUNTER — PHARMACY VISIT (OUTPATIENT)
Dept: PHARMACY | Facility: MEDICAL CENTER | Age: 59
End: 2024-10-14
Payer: COMMERCIAL

## 2024-10-14 PROCEDURE — RXMED WILLOW AMBULATORY MEDICATION CHARGE: Performed by: FAMILY MEDICINE

## 2024-10-23 PROCEDURE — RXMED WILLOW AMBULATORY MEDICATION CHARGE: Performed by: FAMILY MEDICINE

## 2024-10-24 ENCOUNTER — PHARMACY VISIT (OUTPATIENT)
Dept: PHARMACY | Facility: MEDICAL CENTER | Age: 59
End: 2024-10-24
Payer: COMMERCIAL

## 2024-10-26 LAB — NONINV COLON CA DNA+OCC BLD SCRN STL QL: NEGATIVE

## 2025-01-02 ENCOUNTER — OFFICE VISIT (OUTPATIENT)
Dept: MEDICAL GROUP | Facility: MEDICAL CENTER | Age: 60
End: 2025-01-02
Payer: COMMERCIAL

## 2025-01-02 VITALS
RESPIRATION RATE: 13 BRPM | OXYGEN SATURATION: 93 % | SYSTOLIC BLOOD PRESSURE: 112 MMHG | BODY MASS INDEX: 23.19 KG/M2 | TEMPERATURE: 97.5 F | WEIGHT: 153 LBS | HEART RATE: 95 BPM | DIASTOLIC BLOOD PRESSURE: 80 MMHG | HEIGHT: 68 IN

## 2025-01-02 DIAGNOSIS — H55.09: ICD-10-CM

## 2025-01-02 DIAGNOSIS — H81.90 DISORDER OF VESTIBULAR FUNCTION, UNSPECIFIED LATERALITY: ICD-10-CM

## 2025-01-02 DIAGNOSIS — Z02.89 ENCOUNTER FOR COMPLETION OF FORM WITH PATIENT: ICD-10-CM

## 2025-01-02 DIAGNOSIS — Z86.73 HISTORY OF STROKE: ICD-10-CM

## 2025-01-02 DIAGNOSIS — I69.398 HEMIPARESIS AND ALTERATION OF SENSATIONS AS LATE EFFECTS OF CEREBROVASCULAR ACCIDENT (HCC): ICD-10-CM

## 2025-01-02 DIAGNOSIS — Z85.841 HISTORY OF EPENDYMOMA OF BRAIN: ICD-10-CM

## 2025-01-02 DIAGNOSIS — I69.359 HEMIPARESIS AND ALTERATION OF SENSATIONS AS LATE EFFECTS OF CEREBROVASCULAR ACCIDENT (HCC): ICD-10-CM

## 2025-01-02 PROCEDURE — 3079F DIAST BP 80-89 MM HG: CPT | Performed by: FAMILY MEDICINE

## 2025-01-02 PROCEDURE — 3074F SYST BP LT 130 MM HG: CPT | Performed by: FAMILY MEDICINE

## 2025-01-02 PROCEDURE — 99213 OFFICE O/P EST LOW 20 MIN: CPT | Performed by: FAMILY MEDICINE

## 2025-01-02 ASSESSMENT — PATIENT HEALTH QUESTIONNAIRE - PHQ9: CLINICAL INTERPRETATION OF PHQ2 SCORE: 0

## 2025-01-02 ASSESSMENT — FIBROSIS 4 INDEX: FIB4 SCORE: 1.49

## 2025-01-02 NOTE — PROGRESS NOTES
"Verbal consent was acquired by the patient to use [employer] ambient listening note generation during this visit    Subjective:     CC: \"post stroke discussion and paperwork\"    History of Present Illness  The patient is a 59-year-old male who presents for evaluation of late effects from a stroke.    He is seeking an update on his Social Security disability status, which was last updated on 11/09/2024. He experiences persistent dizziness, a residual effect of his stroke and subsequent brain surgery in 2011. The surgery resulted in nerve complications in his right arm and facial tightness on the right side, particularly when he overexerts himself during the day. This tightness is severe enough to cause pain and restricts his ability to open his jaw. These symptoms have remained consistent since the surgery, with no observed improvement or worsening. He must exercise caution during physical activities to avoid overexertion, which can exacerbate his symptoms and lead to a delayed onset of sickness that can last from three days to a week. He also reports anxiety, irritability, and a loss of self-confidence, which have led to his jail. Cognitive testing conducted by a psychologist was incomplete due to the overwhelming nature of the process, which induced nausea and sickness that could persist into the following day. He has consulted with several specialists, including stroke specialists, but none have been able to provide an explanation for his symptoms. However, a consultation with Dr. Beckford, the surgeon who performed his brain surgery, provided some clarity. He underwent brain surgery in 2011 to remove a cancerous tumor that was obstructing his brainstem fluid. The surgery resulted in four strokes. He has lost coordination and has had to discontinue all sports activities. He undergoes biennial MRIs, which have not shown any recurrence of the tumor. He continues to drive, but only in the mornings. He has not " "tried antidepressants for his social anxiety as he does not feel depressed, but lacks confidence in social interactions. He maintains good relationships with his neighbors and occasionally meets up with friends for breakfast. He is scheduled for blood work in 10/2024. Overall, he reports doing well.    MEDICATIONS  Current: gabapentin, baclofen          Objective:     Exam:  /80   Pulse 95   Temp 36.4 °C (97.5 °F) (Temporal)   Resp 13   Ht 1.727 m (5' 8\")   Wt 69.4 kg (153 lb)   SpO2 93%   BMI 23.26 kg/m²  Body mass index is 23.26 kg/m².    Physical Exam  Vitals reviewed.   Constitutional:       Appearance: Normal appearance.   Pulmonary:      Effort: Pulmonary effort is normal. No respiratory distress.   Neurological:      Mental Status: He is alert. Mental status is at baseline.   Psychiatric:         Mood and Affect: Mood normal.         Behavior: Behavior normal.           Results  Imaging  MRI of brain in February 2024 showed no recurrence of tumor.      Assessment & Plan:       1. 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 Disorder of vestibular function, unspecified laterality    3. Barany's nystagmus    4. History of ependymoma of brain    5. History of stroke    6. Encounter for completion of form with patient      Assessment & Plan  1. Late effects from a stroke.  He reports persistent dizziness, nerve problems in his right arm, and tightness in the right side of his face, which worsens with overexertion. These symptoms have remained unchanged since his brain surgery in 2011. He experiences delayed reactions to overexertion, leading to sickness that can last up to a week. He also reports cognitive difficulties, anxiety, and social isolation. He has not tried antidepressants for social anxiety as he does not feel depressed but lacks confidence in social interactions. He is currently taking gabapentin and baclofen to manage his symptoms. He " is advised to continue these medications and avoid overexertion. Cognitive testing through a psychologist was overwhelming and could not be completed. He is advised to continue regular follow-ups with his neurologist and psychologist.    PROCEDURE  The patient underwent brain surgery in 2011, which resulted in four strokes.         Return if symptoms worsen or fail to improve.      This note was created using voice recognition software (Dragon). The accuracy of the dictation is limited by the abilities of the software. I have reviewed the note prior to signing, however some errors in grammar and context are still possible. If you have any questions related to this note please do not hesitate to contact our office.

## 2025-01-16 PROCEDURE — RXMED WILLOW AMBULATORY MEDICATION CHARGE: Performed by: FAMILY MEDICINE

## 2025-01-17 ENCOUNTER — PHARMACY VISIT (OUTPATIENT)
Dept: PHARMACY | Facility: MEDICAL CENTER | Age: 60
End: 2025-01-17
Payer: COMMERCIAL

## 2025-04-04 ENCOUNTER — PHARMACY VISIT (OUTPATIENT)
Dept: PHARMACY | Facility: MEDICAL CENTER | Age: 60
End: 2025-04-04
Payer: COMMERCIAL

## 2025-04-04 PROCEDURE — RXMED WILLOW AMBULATORY MEDICATION CHARGE: Performed by: FAMILY MEDICINE

## 2025-04-16 PROCEDURE — RXMED WILLOW AMBULATORY MEDICATION CHARGE: Performed by: DENTIST

## 2025-04-19 ENCOUNTER — PHARMACY VISIT (OUTPATIENT)
Dept: PHARMACY | Facility: MEDICAL CENTER | Age: 60
End: 2025-04-19
Payer: COMMERCIAL

## 2025-04-30 ENCOUNTER — RESULTS FOLLOW-UP (OUTPATIENT)
Dept: URGENT CARE | Facility: CLINIC | Age: 60
End: 2025-04-30

## 2025-04-30 ENCOUNTER — OFFICE VISIT (OUTPATIENT)
Dept: URGENT CARE | Facility: CLINIC | Age: 60
End: 2025-04-30
Payer: COMMERCIAL

## 2025-04-30 ENCOUNTER — HOSPITAL ENCOUNTER (OUTPATIENT)
Facility: MEDICAL CENTER | Age: 60
End: 2025-04-30
Payer: COMMERCIAL

## 2025-04-30 VITALS
DIASTOLIC BLOOD PRESSURE: 70 MMHG | RESPIRATION RATE: 16 BRPM | SYSTOLIC BLOOD PRESSURE: 110 MMHG | WEIGHT: 150 LBS | HEIGHT: 68 IN | OXYGEN SATURATION: 96 % | HEART RATE: 86 BPM | TEMPERATURE: 99 F | BODY MASS INDEX: 22.73 KG/M2

## 2025-04-30 DIAGNOSIS — J06.9 UPPER RESPIRATORY INFECTION, ACUTE: ICD-10-CM

## 2025-04-30 LAB
FLUAV RNA SPEC QL NAA+PROBE: NEGATIVE
FLUBV RNA SPEC QL NAA+PROBE: NEGATIVE
RSV RNA SPEC QL NAA+PROBE: NEGATIVE
S PYO DNA SPEC NAA+PROBE: NOT DETECTED
SARS-COV-2 RNA RESP QL NAA+PROBE: NEGATIVE

## 2025-04-30 PROCEDURE — 87070 CULTURE OTHR SPECIMN AEROBIC: CPT

## 2025-04-30 RX ORDER — BENZONATATE 100 MG/1
100 CAPSULE ORAL 3 TIMES DAILY PRN
Qty: 30 CAPSULE | Refills: 0 | Status: SHIPPED | OUTPATIENT
Start: 2025-04-30

## 2025-04-30 RX ORDER — DEXTROMETHORPHAN HYDROBROMIDE AND PROMETHAZINE HYDROCHLORIDE 15; 6.25 MG/5ML; MG/5ML
5 SYRUP ORAL
Qty: 25 ML | Refills: 0 | Status: SHIPPED | OUTPATIENT
Start: 2025-04-30 | End: 2025-04-30

## 2025-04-30 RX ORDER — DEXAMETHASONE SODIUM PHOSPHATE 10 MG/ML
10 INJECTION, SOLUTION INTRA-ARTICULAR; INTRALESIONAL; INTRAMUSCULAR; INTRAVENOUS; SOFT TISSUE ONCE
Status: COMPLETED | OUTPATIENT
Start: 2025-04-30 | End: 2025-04-30

## 2025-04-30 RX ORDER — FLUTICASONE PROPIONATE 50 MCG
1 SPRAY, SUSPENSION (ML) NASAL DAILY
Qty: 16 G | Refills: 0 | Status: SHIPPED | OUTPATIENT
Start: 2025-04-30 | End: 2025-04-30

## 2025-04-30 RX ADMIN — DEXAMETHASONE SODIUM PHOSPHATE 10 MG: 10 INJECTION, SOLUTION INTRA-ARTICULAR; INTRALESIONAL; INTRAMUSCULAR; INTRAVENOUS; SOFT TISSUE at 14:17

## 2025-04-30 ASSESSMENT — ENCOUNTER SYMPTOMS
VOMITING: 0
ABDOMINAL PAIN: 0
SORE THROAT: 1
NAUSEA: 0
SHORTNESS OF BREATH: 0
FEVER: 0
CHILLS: 1
COUGH: 1

## 2025-04-30 ASSESSMENT — FIBROSIS 4 INDEX: FIB4 SCORE: 1.52

## 2025-04-30 NOTE — PROGRESS NOTES
Chief Complaint   Patient presents with    Headache     Cough, drainage, x3 days      Rash     Noticed today        HISTORY OF PRESENT ILLNESS: Patient is a pleasant 60 y.o. male who presents to urgent care today cold-like symptoms with a sore throat, cough and drainage that started over the last 3 days, patient has had difficulty sleeping.  He notes a rash that started on his trunk today.    Patient Active Problem List    Diagnosis Date Noted    Prediabetes 09/08/2023    Skin lesion 03/25/2022    Mixed hyperlipidemia 11/23/2021    Leukopenia 05/06/2021    Chalazion of right upper eyelid 01/17/2019    Post-nasal drip 04/24/2018    Chronic right shoulder pain 04/24/2018    History of ependymoma of brain 01/06/2017    History of stroke 02/09/2015    Hemiparesis and alteration of sensations as late effects of cerebrovascular accident (HCC) 02/09/2015    Barany's nystagmus 11/14/2011    Vestibular disorder 11/14/2011    Hearing loss 08/01/2011    PFO (patent foramen ovale) 06/07/2011    Martinez's esophagus with esophagitis 02/23/2011    History of hypertension 01/20/2011    BPH (benign prostatic hypertrophy) 01/20/2011    Spondylolisthesis 01/20/2011       Allergies:Morphine    Current Outpatient Medications Ordered in Epic   Medication Sig Dispense Refill    benzonatate (TESSALON) 100 MG Cap Take 1 Capsule by mouth 3 times a day as needed for Cough. 30 Capsule 0    SODIUM FLUORIDE, DENTAL GEL, (SF 5000 PLUS) 1.1 % Cream Use as directed per provider 51 g 1    Multiple Vitamin (MULTI VITAMIN PO)       gabapentin (NEURONTIN) 300 MG Cap Take 1 capsule orally at 8 am and 1 capsule at 2pm and 2 capsules every night at bedtime 360 Capsule 6    baclofen (LIORESAL) 10 MG Tab Take 1 Tablet by mouth 2 times a day. 180 Tablet 6    PREVIDENT 5000 BOOSTER PLUS 1.1 % Paste       Ascorbic Acid 500 MG Cap CR Take 1 capsule every day by oral route.       No current [facility]-ordered facility-administered medications on file.       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    Other specified disorder of intestines     Snoring     Stroke (HCC)     Vestibular disorder 11/14/2011       Social History     Tobacco Use    Smoking status: Never    Smokeless tobacco: Never   Vaping Use    Vaping status: Never Used   Substance Use Topics    Alcohol use: Not Currently     Comment: rare    Drug use: Never       Family Status   Relation Name Status    Leo Carmen [place] Alive    Fa Julio [place] Alive    Lynn Espino [place] Alive    Sis  Alive   No partnership data on file     Family History   Problem Relation Age of Onset    Cancer Mother         brain tumor, chronic leukemia    Hypertension Father     Hypertension Sister     Breast Cancer Sister        Review of Systems   Constitutional:  Positive for chills and malaise/fatigue. Negative for fever.   HENT:  Positive for congestion and sore throat. Negative for ear pain.    Respiratory:  Positive for cough. Negative for shortness of breath.    Gastrointestinal:  Negative for abdominal pain, nausea and vomiting.   Skin:  Positive for rash. Negative for itching.       Exam:  /70 (BP Location: Left arm, Patient Position: Sitting, BP Cuff Size: Adult)   Pulse 86   Temp 37.2 °C (99 °F) (Temporal)   Resp 16   Ht 1.727 m (5' 8\")   Wt 68 kg (150 lb)   SpO2 96%   Physical Exam  Vitals reviewed.   Constitutional:       General: He is not in acute distress.     Appearance: Normal appearance. He is normal weight.   HENT:      Head: Normocephalic.      Right Ear: Tympanic membrane and ear canal normal. There is no impacted cerumen. Tympanic membrane is " not injected or erythematous.      Left Ear: Tympanic membrane and ear canal normal. There is no impacted cerumen. Tympanic membrane is not injected or erythematous.      Nose: Congestion present. No rhinorrhea.      Mouth/Throat:      Mouth: Mucous membranes are moist.      Pharynx: Oropharynx is clear. Posterior oropharyngeal erythema present. No oropharyngeal exudate.      Tonsils: No tonsillar exudate. 0 on the right. 0 on the left.   Eyes:      General:         Right eye: No discharge.         Left eye: No discharge.      Extraocular Movements: Extraocular movements intact.      Conjunctiva/sclera: Conjunctivae normal.      Pupils: Pupils are equal, round, and reactive to light.   Cardiovascular:      Rate and Rhythm: Normal rate and regular rhythm.      Pulses: Normal pulses.      Heart sounds: Normal heart sounds. No murmur heard.  Pulmonary:      Effort: Pulmonary effort is normal. No respiratory distress.      Breath sounds: Normal breath sounds. No stridor. No wheezing.   Abdominal:      General: Abdomen is flat. Bowel sounds are normal.      Palpations: Abdomen is soft.   Musculoskeletal:         General: Normal range of motion.      Cervical back: Normal range of motion.   Lymphadenopathy:      Cervical: No cervical adenopathy.   Skin:     General: Skin is warm and dry.      Capillary Refill: Capillary refill takes less than 2 seconds.      Findings: Rash present.      Comments: Noted rash to the trunk, area is blanchable, nonraised, pinpoint in nature   Neurological:      General: No focal deficit present.      Mental Status: He is alert.      Sensory: No sensory deficit.      Motor: No weakness.   Psychiatric:         Mood and Affect: Mood normal.         Behavior: Behavior normal.         Thought Content: Thought content normal.         Judgment: Judgment normal.       Assessment/Plan:  1. Upper respiratory infection, acute  - POCT CoV-2, Flu A/B, RSV by PCR  - benzonatate (TESSALON) 100 MG Cap; Take  1 Capsule by mouth 3 times a day as needed for Cough.  Dispense: 30 Capsule; Refill: 0  - dexamethasone (Decadron) injection (check route below) 10 mg  - POCT GROUP A STREP, PCR  - CULTURE THROAT; Future    Based on patient's physical presentation along with review of systems I do think they likely have a viral illness.  Patient swabbed, negative for anything significant, notified via MyChart.  Vitals are within normal limits, lung sounds are clear to auscultation.  I did go ahead and place patient on medications for comfort measures to include a dose of Decadron here in the office for his ongoing sore throat, he states it has been keeping him up at night. Advised patient to drink plenty of fluids, take Motrin and Tylenol as needed, vitamin C, D.  Patient is aware of the plan of care and agreeable at this time, encouraged them to follow-up if they continue to get worse or do not improve.    Supportive care, differential diagnoses, and indications for immediate follow-up discussed with patient.   Pathogenesis of diagnosis discussed including typical length and natural progression.   Instructed to return to clinic or nearest emergency department for any change in condition, further concerns, or worsening of symptoms.  Patient states understanding of the plan of care and discharge instructions.  Instructed to make an appointment, for follow up, with primary care provider.    Please note that this dictation was created using voice recognition software. I have made every reasonable attempt to correct obvious errors, but I expect that there are errors of grammar and possibly content that I did not discover before finalizing the note.      Vicky OBRIEN

## 2025-04-30 NOTE — LETTER
April 30, 2025    To Whom It May Concern:         This is confirmation that Raul Hinojosa attended his scheduled appointment with FREDY Valencia on 4/30/25.         If you have any questions please do not hesitate to call me at the phone number listed below.    Sincerely,          RICHARD Valencia.  [phone]

## 2025-05-02 ENCOUNTER — RESULTS FOLLOW-UP (OUTPATIENT)
Dept: URGENT CARE | Facility: PHYSICIAN GROUP | Age: 60
End: 2025-05-02

## 2025-05-03 LAB
BACTERIA SPEC RESP CULT: NORMAL
SIGNIFICANT IND 70042: NORMAL
SITE SITE: NORMAL
SOURCE SOURCE: NORMAL

## 2025-05-06 PROCEDURE — RXMED WILLOW AMBULATORY MEDICATION CHARGE

## 2025-05-07 ENCOUNTER — PHARMACY VISIT (OUTPATIENT)
Dept: PHARMACY | Facility: MEDICAL CENTER | Age: 60
End: 2025-05-07
Payer: COMMERCIAL

## 2025-05-07 PROCEDURE — RXOTC WILLOW AMBULATORY OTC CHARGE

## 2025-05-15 ENCOUNTER — PHARMACY VISIT (OUTPATIENT)
Dept: PHARMACY | Facility: MEDICAL CENTER | Age: 60
End: 2025-05-15
Payer: COMMERCIAL

## 2025-05-15 PROCEDURE — RXMED WILLOW AMBULATORY MEDICATION CHARGE

## 2025-05-15 RX ORDER — INDOMETHACIN 50 MG/1
CAPSULE ORAL
Qty: 15 CAPSULE | Refills: 0 | OUTPATIENT
Start: 2025-05-15

## 2025-05-15 RX ORDER — COLCHICINE 0.6 MG/1
TABLET ORAL
Qty: 3 TABLET | Refills: 0 | OUTPATIENT
Start: 2025-05-15

## 2025-07-10 DIAGNOSIS — Z86.73 HISTORY OF STROKE: Primary | ICD-10-CM

## 2025-07-10 DIAGNOSIS — I69.398 HEMIPARESIS AND ALTERATION OF SENSATIONS AS LATE EFFECTS OF CEREBROVASCULAR ACCIDENT (HCC): ICD-10-CM

## 2025-07-10 DIAGNOSIS — I69.359 HEMIPARESIS AND ALTERATION OF SENSATIONS AS LATE EFFECTS OF CEREBROVASCULAR ACCIDENT (HCC): ICD-10-CM

## 2025-07-11 PROCEDURE — RXMED WILLOW AMBULATORY MEDICATION CHARGE

## 2025-07-11 RX ORDER — GABAPENTIN 300 MG/1
CAPSULE ORAL
Qty: 360 CAPSULE | Refills: 0 | Status: SHIPPED | OUTPATIENT
Start: 2025-07-11

## 2025-07-11 RX ORDER — BACLOFEN 10 MG/1
10 TABLET ORAL 2 TIMES DAILY
Qty: 180 TABLET | Refills: 0 | Status: SHIPPED | OUTPATIENT
Start: 2025-07-11

## 2025-07-14 ENCOUNTER — PHARMACY VISIT (OUTPATIENT)
Dept: PHARMACY | Facility: MEDICAL CENTER | Age: 60
End: 2025-07-14
Payer: COMMERCIAL

## 2025-07-28 PROCEDURE — RXMED WILLOW AMBULATORY MEDICATION CHARGE: Performed by: DENTIST

## 2025-07-31 ENCOUNTER — PHARMACY VISIT (OUTPATIENT)
Dept: PHARMACY | Facility: MEDICAL CENTER | Age: 60
End: 2025-07-31
Payer: COMMERCIAL